# Patient Record
Sex: MALE | Race: WHITE | Employment: UNEMPLOYED | URBAN - NONMETROPOLITAN AREA
[De-identification: names, ages, dates, MRNs, and addresses within clinical notes are randomized per-mention and may not be internally consistent; named-entity substitution may affect disease eponyms.]

---

## 2024-07-18 ENCOUNTER — OFFICE VISIT (OUTPATIENT)
Dept: FAMILY MEDICINE CLINIC | Facility: CLINIC | Age: 44
End: 2024-07-18
Payer: COMMERCIAL

## 2024-07-18 VITALS
HEIGHT: 68 IN | OXYGEN SATURATION: 98 % | SYSTOLIC BLOOD PRESSURE: 122 MMHG | HEART RATE: 73 BPM | DIASTOLIC BLOOD PRESSURE: 76 MMHG | BODY MASS INDEX: 27.74 KG/M2 | TEMPERATURE: 97.3 F | WEIGHT: 183 LBS

## 2024-07-18 DIAGNOSIS — Z13.29 SCREENING FOR THYROID DISORDER: ICD-10-CM

## 2024-07-18 DIAGNOSIS — Z11.59 NEED FOR HEPATITIS C SCREENING TEST: ICD-10-CM

## 2024-07-18 DIAGNOSIS — Z11.4 SCREENING FOR HIV (HUMAN IMMUNODEFICIENCY VIRUS): ICD-10-CM

## 2024-07-18 DIAGNOSIS — Z13.220 SCREENING FOR LIPID DISORDERS: ICD-10-CM

## 2024-07-18 DIAGNOSIS — Z13.6 SCREENING FOR CARDIOVASCULAR CONDITION: ICD-10-CM

## 2024-07-18 DIAGNOSIS — Z91.89 HAS MULTIPLE SEXUAL PARTNERS: ICD-10-CM

## 2024-07-18 DIAGNOSIS — Z00.00 ANNUAL PHYSICAL EXAM: Primary | ICD-10-CM

## 2024-07-18 DIAGNOSIS — Z13.1 SCREENING FOR DIABETES MELLITUS (DM): ICD-10-CM

## 2024-07-18 PROCEDURE — 99386 PREV VISIT NEW AGE 40-64: CPT | Performed by: FAMILY MEDICINE

## 2024-07-18 NOTE — PATIENT INSTRUCTIONS
"Patient Education     Routine physical for adults   The Basics   Written by the doctors and editors at [facility]   What is a physical? -- A physical is a routine visit, or \"check-up,\" with your doctor. You might also hear it called a \"wellness visit\" or \"preventive visit.\"  During each visit, the doctor will:   Ask about your physical and mental health   Ask about your habits, behaviors, and lifestyle   Do an exam   Give you vaccines if needed   Talk to you about any medicines you take   Give advice about your health   Answer your questions  Getting regular check-ups is an important part of taking care of your health. It can help your doctor find and treat any problems you have. But it's also important for preventing health problems.  A routine physical is different from a \"sick visit.\" A sick visit is when you see a doctor because of a health concern or problem. Since physicals are scheduled ahead of time, you can think about what you want to ask the doctor.  How often should I get a physical? -- It depends on your age and health. In general, for people age 21 years and older:   If you are younger than 50 years, you might be able to get a physical every 3 years.   If you are 50 years or older, your doctor might recommend a physical every year.  If you have an ongoing health condition, like diabetes or high blood pressure, your doctor will probably want to see you more often.  What happens during a physical? -- In general, each visit will include:   Physical exam - The doctor or nurse will check your height, weight, heart rate, and blood pressure. They will also look at your eyes and ears. They will ask about how you are feeling and whether you have any symptoms that bother you.   Medicines - It's a good idea to bring a list of all the medicines you take to each doctor visit. Your doctor will talk to you about your medicines and answer any questions. Tell them if you are having any side effects that bother you. You " "should also tell them if you are having trouble paying for any of your medicines.   Habits and behaviors - This includes:   Your diet   Your exercise habits   Whether you smoke, drink alcohol, or use drugs   Whether you are sexually active   Whether you feel safe at home  Your doctor will talk to you about things you can do to improve your health and lower your risk of health problems. They will also offer help and support. For example, if you want to quit smoking, they can give you advice and might prescribe medicines. If you want to improve your diet or get more physical activity, they can help you with this, too.   Lab tests, if needed - The tests you get will depend on your age and situation. For example, your doctor might want to check your:   Cholesterol   Blood sugar   Iron level   Vaccines - The recommended vaccines will depend on your age, health, and what vaccines you already had. Vaccines are very important because they can prevent certain serious or deadly infections.   Discussion of screening - \"Screening\" means checking for diseases or other health problems before they cause symptoms. Your doctor can recommend screening based on your age, risk, and preferences. This might include tests to check for:   Cancer, such as breast, prostate, cervical, ovarian, colorectal, prostate, lung, or skin cancer   Sexually transmitted infections, such as chlamydia and gonorrhea   Mental health conditions like depression and anxiety  Your doctor will talk to you about the different types of screening tests. They can help you decide which screenings to have. They can also explain what the results might mean.   Answering questions - The physical is a good time to ask the doctor or nurse questions about your health. If needed, they can refer you to other doctors or specialists, too.  Adults older than 65 years often need other care, too. As you get older, your doctor will talk to you about:   How to prevent falling at " home   Hearing or vision tests   Memory testing   How to take your medicines safely   Making sure that you have the help and support you need at home  All topics are updated as new evidence becomes available and our peer review process is complete.  This topic retrieved from [employer] on: May 02, 2024.  Topic 498926 Version 1.0  Release: 32.4.3 - C32.122  © 2024 UpToDate, Inc. and/or its affiliates. All rights reserved.  Consumer Information Use and Disclaimer   Disclaimer: This generalized information is a limited summary of diagnosis, treatment, and/or medication information. It is not meant to be comprehensive and should be used as a tool to help the user understand and/or assess potential diagnostic and treatment options. It does NOT include all information about conditions, treatments, medications, side effects, or risks that may apply to a specific patient. It is not intended to be medical advice or a substitute for the medical advice, diagnosis, or treatment of a health care provider based on the health care provider's examination and assessment of a patient's specific and unique circumstances. Patients must speak with a health care provider for complete information about their health, medical questions, and treatment options, including any risks or benefits regarding use of medications. This information does not endorse any treatments or medications as safe, effective, or approved for treating a specific patient. UpToDate, Inc. and its affiliates disclaim any warranty or liability relating to this information or the use thereof.The use of this information is governed by the Terms of Use, available at https://www.woltersPinion.gguwer.com/en/know/clinical-effectiveness-terms. 2024© UpToDate, Inc. and its affiliates and/or licensors. All rights reserved.  Copyright   © 2024 UpToDate, Inc. and/or its affiliates. All rights reserved.

## 2024-07-18 NOTE — PROGRESS NOTES
Adult Annual Physical  Name: Jaime Carranza      : 1980      MRN: [id number]  Encounter Provider: Isaías Cosme MD  Encounter Date: 2024   Encounter department: [facility]    44-year-old patient with no past medical history presenting to the clinic to establish care.  Patient has no past family history and is not on any medication, past surgical hx of right tibia iron suresh, no tobacco or other illicit drug use, social alcohol use. 2 Female sexual partners in last 6 months, no condom usage, last checked 5 years ago but no hx of STD.  Patient will be scheduled for a 1 year annual physical unless labs indicate otherwise.  Assessment & Plan   1. Annual physical exam  2. Screening for lipid disorders  -     Lipid Panel with Direct LDL reflex; Future  3. Screening for cardiovascular condition  -     Comprehensive metabolic panel; Future  -     CBC and differential; Future  4. Screening for diabetes mellitus (DM)  -     Hemoglobin A1C; Future  5. Screening for thyroid disorder  -     T4, free; Future  -     TSH, 3rd generation; Future  6. Screening for HIV (human immunodeficiency virus)  -     HIV 1/2 AG/AB w Reflex [facility] for 2 yr old and above; Future  7. Need for hepatitis C screening test  -     Hepatitis C antibody; Future  8. Has multiple sexual partners  Comments:  Patient states that he has had 2 female partners in the last 6 months  Denies condom usage  Denies any penile discharge, pain on urination, history of STDs  Orders:  -     Chlamydia/GC amplified DNA by PCR; Future  -     RPR-Syphilis Screening (Total Syphilis IGG/IGM); Future    Immunizations and preventive care screenings were discussed with patient today. Appropriate education was printed on patient's after visit summary.    Counseling:  Alcohol/drug use: discussed moderation in alcohol intake, the recommendations for healthy alcohol use, and avoidance of illicit drug use.  Dental Health: discussed importance of  regular tooth brushing, flossing, and dental visits.  Injury prevention: discussed safety/seat belts, safety helmets, smoke detectors, carbon dioxide detectors, and smoking near bedding or upholstery.      Depression Screening and Follow-up Plan: Patient was screened for depression during today's encounter. They screened negative with a PHQ-2 score of 0.        History of Present Illness     Adult Annual Physical:  Patient presents for annual physical. Originally from  Works in construction . Lifts with fork lifts.     2 partners in last 6 months. No condom. No burning on urination, penile discharge, concerns for STD, none in past. Last checked 5 years ago.     Diet and Physical Activity:  - Diet/Nutrition: well balanced diet and consuming 3-5 servings of fruits/vegetables daily. No sodas or sugary drinks. mostly water.  - Exercise:. Weight lifting every 1-2 weeks    Depression Screening:  - PHQ-2 Score: 0    General Health:  - Sleep: 7-8 hours of sleep on average and sleeps well.  - Hearing: normal hearing bilateral ears.  - Vision: no vision problems and most recent eye exam > 1 year ago. Advised optometrist visit  - Dental: no dental visits for > 1 year and brushes teeth once daily. Last seen 2017     Health:  - History of STDs: no.   - Urinary symptoms: none.     Review of Systems   Constitutional:  Negative for chills and fever.   HENT:  Negative for ear pain and sore throat.    Eyes:  Negative for pain and visual disturbance.   Respiratory:  Negative for cough and shortness of breath.    Cardiovascular:  Negative for chest pain and palpitations.   Gastrointestinal:  Negative for abdominal pain, diarrhea, nausea and vomiting.   Genitourinary:  Negative for dysuria, hematuria, penile discharge and penile pain.   Musculoskeletal:  Negative for arthralgias and back pain.   Skin:  Negative for color change and rash.   Neurological:  Negative for seizures and syncope.         Objective     /76 (BP  "Location: Right arm)   Pulse 73   Temp (!) 97.3 °F (36.3 °C) (Tympanic)   Ht 5' 8.11\" (1.73 m)   Wt 83 kg (183 lb)   SpO2 98%   BMI 27.74 kg/m²     Physical Exam  Vitals reviewed.   Constitutional:       General: He is not in acute distress.     Appearance: He is well-developed.   HENT:      Head: Normocephalic and atraumatic.   Eyes:      Conjunctiva/sclera: Conjunctivae normal.   Cardiovascular:      Rate and Rhythm: Normal rate and regular rhythm.      Heart sounds: No murmur heard.  Pulmonary:      Effort: Pulmonary effort is normal. No respiratory distress.      Breath sounds: Normal breath sounds.   Abdominal:      Palpations: Abdomen is soft.      Tenderness: There is no abdominal tenderness.   Musculoskeletal:         General: No swelling.      Cervical back: Neck supple.   Skin:     General: Skin is warm.      Capillary Refill: Capillary refill takes less than 2 seconds.   Neurological:      Mental Status: He is alert.      Gait: Gait normal.   Psychiatric:         Mood and Affect: Mood normal.         "

## 2024-07-20 ENCOUNTER — LAB (OUTPATIENT)
Dept: LAB | Facility: CLINIC | Age: 44
End: 2024-07-20
Payer: COMMERCIAL

## 2024-07-20 DIAGNOSIS — Z11.4 SCREENING FOR HIV (HUMAN IMMUNODEFICIENCY VIRUS): ICD-10-CM

## 2024-07-20 DIAGNOSIS — Z11.59 NEED FOR HEPATITIS C SCREENING TEST: ICD-10-CM

## 2024-07-20 DIAGNOSIS — Z13.220 SCREENING FOR LIPID DISORDERS: ICD-10-CM

## 2024-07-20 DIAGNOSIS — Z91.89 HAS MULTIPLE SEXUAL PARTNERS: ICD-10-CM

## 2024-07-20 DIAGNOSIS — Z13.29 SCREENING FOR THYROID DISORDER: ICD-10-CM

## 2024-07-20 DIAGNOSIS — Z13.6 SCREENING FOR CARDIOVASCULAR CONDITION: ICD-10-CM

## 2024-07-20 DIAGNOSIS — Z13.1 SCREENING FOR DIABETES MELLITUS (DM): ICD-10-CM

## 2024-07-20 PROCEDURE — 86780 TREPONEMA PALLIDUM: CPT

## 2024-07-20 PROCEDURE — 87491 CHLMYD TRACH DNA AMP PROBE: CPT

## 2024-07-20 PROCEDURE — 80053 COMPREHEN METABOLIC PANEL: CPT

## 2024-07-20 PROCEDURE — 87591 N.GONORRHOEAE DNA AMP PROB: CPT

## 2024-07-20 PROCEDURE — 85025 COMPLETE CBC W/AUTO DIFF WBC: CPT

## 2024-07-20 PROCEDURE — 84439 ASSAY OF FREE THYROXINE: CPT

## 2024-07-20 PROCEDURE — 84443 ASSAY THYROID STIM HORMONE: CPT

## 2024-07-20 PROCEDURE — 87389 HIV-1 AG W/HIV-1&-2 AB AG IA: CPT

## 2024-07-20 PROCEDURE — 36415 COLL VENOUS BLD VENIPUNCTURE: CPT

## 2024-07-20 PROCEDURE — 80061 LIPID PANEL: CPT

## 2024-07-20 PROCEDURE — 83036 HEMOGLOBIN GLYCOSYLATED A1C: CPT

## 2024-07-20 PROCEDURE — 86803 HEPATITIS C AB TEST: CPT

## 2024-07-22 LAB
ALBUMIN SERPL BCG-MCNC: 4.6 G/DL (ref 3.5–5)
ALP SERPL-CCNC: 56 U/L (ref 34–104)
ALT SERPL W P-5'-P-CCNC: 25 U/L (ref 7–52)
ANION GAP SERPL CALCULATED.3IONS-SCNC: 9 MMOL/L (ref 4–13)
AST SERPL W P-5'-P-CCNC: 21 U/L (ref 13–39)
BASOPHILS # BLD AUTO: 0.03 THOUSANDS/ÂΜL (ref 0–0.1)
BASOPHILS NFR BLD AUTO: 1 % (ref 0–1)
BILIRUB SERPL-MCNC: 0.48 MG/DL (ref 0.2–1)
BUN SERPL-MCNC: 20 MG/DL (ref 5–25)
CALCIUM SERPL-MCNC: 10 MG/DL (ref 8.4–10.2)
CHLORIDE SERPL-SCNC: 98 MMOL/L (ref 96–108)
CHOLEST SERPL-MCNC: 246 MG/DL
CO2 SERPL-SCNC: 30 MMOL/L (ref 21–32)
CREAT SERPL-MCNC: 0.97 MG/DL (ref 0.6–1.3)
EOSINOPHIL # BLD AUTO: 0.16 THOUSAND/ÂΜL (ref 0–0.61)
EOSINOPHIL NFR BLD AUTO: 3 % (ref 0–6)
ERYTHROCYTE [DISTWIDTH] IN BLOOD BY AUTOMATED COUNT: 12.9 % (ref 11.6–15.1)
EST. AVERAGE GLUCOSE BLD GHB EST-MCNC: 123 MG/DL
GFR SERPL CREATININE-BSD FRML MDRD: 95 ML/MIN/1.73SQ M
GLUCOSE P FAST SERPL-MCNC: 81 MG/DL (ref 65–99)
HBA1C MFR BLD: 5.9 %
HCT VFR BLD AUTO: 49 % (ref 36.5–49.3)
HCV AB SER QL: NORMAL
HDLC SERPL-MCNC: 53 MG/DL
HGB BLD-MCNC: 15.8 G/DL (ref 12–17)
HIV 1+2 AB+HIV1 P24 AG SERPL QL IA: NORMAL
HIV 2 AB SERPL QL IA: NORMAL
HIV1 AB SERPL QL IA: NORMAL
HIV1 P24 AG SERPL QL IA: NORMAL
IMM GRANULOCYTES # BLD AUTO: 0.01 THOUSAND/UL (ref 0–0.2)
IMM GRANULOCYTES NFR BLD AUTO: 0 % (ref 0–2)
LDLC SERPL CALC-MCNC: 156 MG/DL (ref 0–100)
LYMPHOCYTES # BLD AUTO: 2.7 THOUSANDS/ÂΜL (ref 0.6–4.47)
LYMPHOCYTES NFR BLD AUTO: 45 % (ref 14–44)
MCH RBC QN AUTO: 29.2 PG (ref 26.8–34.3)
MCHC RBC AUTO-ENTMCNC: 32.2 G/DL (ref 31.4–37.4)
MCV RBC AUTO: 91 FL (ref 82–98)
MONOCYTES # BLD AUTO: 0.44 THOUSAND/ÂΜL (ref 0.17–1.22)
MONOCYTES NFR BLD AUTO: 8 % (ref 4–12)
NEUTROPHILS # BLD AUTO: 2.52 THOUSANDS/ÂΜL (ref 1.85–7.62)
NEUTS SEG NFR BLD AUTO: 43 % (ref 43–75)
NRBC BLD AUTO-RTO: 0 /100 WBCS
PLATELET # BLD AUTO: 228 THOUSANDS/UL (ref 149–390)
PMV BLD AUTO: 11.8 FL (ref 8.9–12.7)
POTASSIUM SERPL-SCNC: 4.7 MMOL/L (ref 3.5–5.3)
PROT SERPL-MCNC: 7.9 G/DL (ref 6.4–8.4)
RBC # BLD AUTO: 5.41 MILLION/UL (ref 3.88–5.62)
SODIUM SERPL-SCNC: 137 MMOL/L (ref 135–147)
T4 FREE SERPL-MCNC: 0.92 NG/DL (ref 0.61–1.12)
TREPONEMA PALLIDUM IGG+IGM AB [PRESENCE] IN SERUM OR PLASMA BY IMMUNOASSAY: NORMAL
TRIGL SERPL-MCNC: 187 MG/DL
TSH SERPL DL<=0.05 MIU/L-ACNC: 1.3 UIU/ML (ref 0.45–4.5)
WBC # BLD AUTO: 5.86 THOUSAND/UL (ref 4.31–10.16)

## 2024-07-23 LAB
C TRACH DNA SPEC QL NAA+PROBE: NEGATIVE
N GONORRHOEA DNA SPEC QL NAA+PROBE: NEGATIVE

## 2024-07-26 ENCOUNTER — TELEPHONE (OUTPATIENT)
Dept: FAMILY MEDICINE CLINIC | Facility: CLINIC | Age: 44
End: 2024-07-26

## 2024-07-26 NOTE — TELEPHONE ENCOUNTER
Called patient to relay test results below. Patient is aware and no questions or concerns.    Patient does not want to get [facility] notifications.

## 2024-07-26 NOTE — TELEPHONE ENCOUNTER
----- Message from Isaías Cosme MD sent at 7/25/2024  2:03 PM EDT -----  Hi ladies,    Can you please call patient and let him know that his blood work overall looks great.  His kidneys, liver, thyroid look good.  He does have prediabetes and his cholesterol levels are elevated at this time, but do not require medications yet.  I would highly recommend patient make some dietary changes and increasing exercise including trying to get 150 minutes of exercise a week.  He is negative for all of the STDs that we tested for and does not have hepatitis C or HIV. Thank you.

## 2024-08-15 ENCOUNTER — APPOINTMENT (OUTPATIENT)
Dept: RADIOLOGY | Facility: CLINIC | Age: 44
End: 2024-08-15
Payer: COMMERCIAL

## 2024-08-15 ENCOUNTER — OFFICE VISIT (OUTPATIENT)
Dept: URGENT CARE | Facility: CLINIC | Age: 44
End: 2024-08-15
Payer: COMMERCIAL

## 2024-08-15 VITALS
SYSTOLIC BLOOD PRESSURE: 138 MMHG | DIASTOLIC BLOOD PRESSURE: 87 MMHG | TEMPERATURE: 97.6 F | RESPIRATION RATE: 18 BRPM | OXYGEN SATURATION: 98 % | HEART RATE: 74 BPM

## 2024-08-15 DIAGNOSIS — M79.661 PAIN IN RIGHT LOWER LEG: Primary | ICD-10-CM

## 2024-08-15 DIAGNOSIS — S83.91XA SPRAIN OF RIGHT KNEE, UNSPECIFIED LIGAMENT, INITIAL ENCOUNTER: ICD-10-CM

## 2024-08-15 DIAGNOSIS — M79.661 PAIN IN RIGHT LOWER LEG: ICD-10-CM

## 2024-08-15 PROCEDURE — 73630 X-RAY EXAM OF FOOT: CPT

## 2024-08-15 PROCEDURE — 73590 X-RAY EXAM OF LOWER LEG: CPT

## 2024-08-15 PROCEDURE — 73562 X-RAY EXAM OF KNEE 3: CPT

## 2024-08-15 PROCEDURE — 99203 OFFICE O/P NEW LOW 30 MIN: CPT | Performed by: STUDENT IN AN ORGANIZED HEALTH CARE EDUCATION/TRAINING PROGRAM

## 2024-08-15 RX ORDER — MELOXICAM 15 MG/1
15 TABLET ORAL DAILY
Qty: 10 TABLET | Refills: 0 | Status: SHIPPED | OUTPATIENT
Start: 2024-08-15 | End: 2024-08-25
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
## 2025-01-02 ENCOUNTER — HOSPITAL ENCOUNTER (EMERGENCY)
Facility: HOSPITAL | Age: 45
Discharge: HOME | End: 2025-01-02
Attending: EMERGENCY MEDICINE
Payer: COMMERCIAL

## 2025-01-02 ENCOUNTER — OFFICE VISIT (OUTPATIENT)
Dept: URGENT CARE | Facility: CLINIC | Age: 45
End: 2025-01-02
Payer: COMMERCIAL

## 2025-01-02 ENCOUNTER — APPOINTMENT (EMERGENCY)
Dept: RADIOLOGY | Facility: HOSPITAL | Age: 45
End: 2025-01-02
Payer: COMMERCIAL

## 2025-01-02 ENCOUNTER — APPOINTMENT (EMERGENCY)
Dept: CT IMAGING | Facility: HOSPITAL | Age: 45
End: 2025-01-02
Payer: COMMERCIAL

## 2025-01-02 VITALS
OXYGEN SATURATION: 99 % | DIASTOLIC BLOOD PRESSURE: 92 MMHG | SYSTOLIC BLOOD PRESSURE: 136 MMHG | WEIGHT: 190 LBS | RESPIRATION RATE: 18 BRPM | HEIGHT: 69 IN | HEART RATE: 83 BPM | TEMPERATURE: 98.2 F | BODY MASS INDEX: 28.14 KG/M2

## 2025-01-02 DIAGNOSIS — R93.5 ABNORMAL CT OF THE ABDOMEN: ICD-10-CM

## 2025-01-02 DIAGNOSIS — R59.0 MEDIASTINAL LYMPHADENOPATHY: Primary | ICD-10-CM

## 2025-01-02 DIAGNOSIS — R91.8 PULMONARY NODULES: ICD-10-CM

## 2025-01-02 DIAGNOSIS — R07.9 CHEST PAIN, UNSPECIFIED TYPE: Primary | ICD-10-CM

## 2025-01-02 LAB
2HR DELTA HS TROPONIN: 0 NG/L
4HR DELTA HS TROPONIN: 0 NG/L
ALBUMIN SERPL BCG-MCNC: 4.5 G/DL (ref 3.5–5)
ALP SERPL-CCNC: 85 U/L (ref 34–104)
ALT SERPL W P-5'-P-CCNC: 31 U/L (ref 7–52)
ANION GAP SERPL CALCULATED.3IONS-SCNC: 6 MMOL/L (ref 4–13)
APTT PPP: 30 SECONDS (ref 23–34)
AST SERPL W P-5'-P-CCNC: 22 U/L (ref 13–39)
ATRIAL RATE: 69 BPM
B-OH-BUTYR SERPL-MCNC: <0.05 MMOL/L (ref 0.02–0.27)
BASOPHILS # BLD AUTO: 0.03 THOUSANDS/ΜL (ref 0–0.1)
BASOPHILS NFR BLD AUTO: 1 % (ref 0–1)
BILIRUB SERPL-MCNC: 0.46 MG/DL (ref 0.2–1)
BILIRUB UR QL STRIP: NEGATIVE
BUN SERPL-MCNC: 17 MG/DL (ref 5–25)
CALCIUM SERPL-MCNC: 9.8 MG/DL (ref 8.4–10.2)
CARDIAC TROPONIN I PNL SERPL HS: 3 NG/L (ref ?–50)
CHLORIDE SERPL-SCNC: 100 MMOL/L (ref 96–108)
CLARITY UR: CLEAR
CO2 SERPL-SCNC: 31 MMOL/L (ref 21–32)
COLOR UR: COLORLESS
CREAT SERPL-MCNC: 1.31 MG/DL (ref 0.6–1.3)
D DIMER PPP FEU-MCNC: 0.4 UG/ML FEU
EOSINOPHIL # BLD AUTO: 0.17 THOUSAND/ΜL (ref 0–0.61)
EOSINOPHIL NFR BLD AUTO: 3 % (ref 0–6)
ERYTHROCYTE [DISTWIDTH] IN BLOOD BY AUTOMATED COUNT: 12.9 % (ref 11.6–15.1)
GFR SERPL CREATININE-BSD FRML MDRD: 66 ML/MIN/1.73SQ M
GLUCOSE SERPL-MCNC: 90 MG/DL (ref 65–140)
GLUCOSE UR STRIP-MCNC: NEGATIVE MG/DL
HCT VFR BLD AUTO: 43 % (ref 36.5–49.3)
HGB BLD-MCNC: 14.1 G/DL (ref 12–17)
HGB UR QL STRIP.AUTO: NEGATIVE
IMM GRANULOCYTES # BLD AUTO: 0.01 THOUSAND/UL (ref 0–0.2)
IMM GRANULOCYTES NFR BLD AUTO: 0 % (ref 0–2)
INR PPP: 0.96 (ref 0.85–1.19)
KETONES UR STRIP-MCNC: NEGATIVE MG/DL
LACTATE SERPL-SCNC: 0.9 MMOL/L (ref 0.5–2)
LEUKOCYTE ESTERASE UR QL STRIP: NEGATIVE
LIPASE SERPL-CCNC: 20 U/L (ref 11–82)
LYMPHOCYTES # BLD AUTO: 1.49 THOUSANDS/ΜL (ref 0.6–4.47)
LYMPHOCYTES NFR BLD AUTO: 27 % (ref 14–44)
MAGNESIUM SERPL-MCNC: 2 MG/DL (ref 1.9–2.7)
MCH RBC QN AUTO: 27.3 PG (ref 26.8–34.3)
MCHC RBC AUTO-ENTMCNC: 32.8 G/DL (ref 31.4–37.4)
MCV RBC AUTO: 83 FL (ref 82–98)
MONOCYTES # BLD AUTO: 0.67 THOUSAND/ΜL (ref 0.17–1.22)
MONOCYTES NFR BLD AUTO: 12 % (ref 4–12)
NEUTROPHILS # BLD AUTO: 3.14 THOUSANDS/ΜL (ref 1.85–7.62)
NEUTS SEG NFR BLD AUTO: 57 % (ref 43–75)
NITRITE UR QL STRIP: NEGATIVE
NRBC BLD AUTO-RTO: 0 /100 WBCS
P AXIS: 67 DEGREES
PH UR STRIP.AUTO: 6.5 [PH]
PLATELET # BLD AUTO: 268 THOUSANDS/UL (ref 149–390)
PMV BLD AUTO: 10.9 FL (ref 8.9–12.7)
POTASSIUM SERPL-SCNC: 3.6 MMOL/L (ref 3.5–5.3)
PR INTERVAL: 148 MS
PROCALCITONIN SERPL-MCNC: 0.09 NG/ML
PROT SERPL-MCNC: 7.8 G/DL (ref 6.4–8.4)
PROT UR STRIP-MCNC: NEGATIVE MG/DL
PROTHROMBIN TIME: 13.3 SECONDS (ref 12.3–15)
QRS AXIS: 0 DEGREES
QRSD INTERVAL: 96 MS
QT INTERVAL: 414 MS
QTC INTERVAL: 443 MS
RBC # BLD AUTO: 5.16 MILLION/UL (ref 3.88–5.62)
SODIUM SERPL-SCNC: 137 MMOL/L (ref 135–147)
SP GR UR STRIP.AUTO: <1.005 (ref 1–1.03)
T WAVE AXIS: 39 DEGREES
TSH SERPL DL<=0.05 MIU/L-ACNC: 0.99 UIU/ML (ref 0.45–4.5)
UROBILINOGEN UR STRIP-ACNC: <2 MG/DL
VENTRICULAR RATE: 69 BPM
WBC # BLD AUTO: 5.51 THOUSAND/UL (ref 4.31–10.16)

## 2025-01-02 PROCEDURE — 36415 COLL VENOUS BLD VENIPUNCTURE: CPT | Performed by: EMERGENCY MEDICINE

## 2025-01-02 PROCEDURE — 71275 CT ANGIOGRAPHY CHEST: CPT

## 2025-01-02 PROCEDURE — 99285 EMERGENCY DEPT VISIT HI MDM: CPT

## 2025-01-02 PROCEDURE — 85025 COMPLETE CBC W/AUTO DIFF WBC: CPT | Performed by: EMERGENCY MEDICINE

## 2025-01-02 PROCEDURE — 99213 OFFICE O/P EST LOW 20 MIN: CPT | Performed by: PHYSICIAN ASSISTANT

## 2025-01-02 PROCEDURE — 99285 EMERGENCY DEPT VISIT HI MDM: CPT | Performed by: EMERGENCY MEDICINE

## 2025-01-02 PROCEDURE — 85379 FIBRIN DEGRADATION QUANT: CPT | Performed by: EMERGENCY MEDICINE

## 2025-01-02 PROCEDURE — 81003 URINALYSIS AUTO W/O SCOPE: CPT | Performed by: EMERGENCY MEDICINE

## 2025-01-02 PROCEDURE — 84484 ASSAY OF TROPONIN QUANT: CPT | Performed by: EMERGENCY MEDICINE

## 2025-01-02 PROCEDURE — 84443 ASSAY THYROID STIM HORMONE: CPT | Performed by: EMERGENCY MEDICINE

## 2025-01-02 PROCEDURE — 83690 ASSAY OF LIPASE: CPT | Performed by: EMERGENCY MEDICINE

## 2025-01-02 PROCEDURE — 84145 PROCALCITONIN (PCT): CPT | Performed by: EMERGENCY MEDICINE

## 2025-01-02 PROCEDURE — 82010 KETONE BODYS QUAN: CPT | Performed by: EMERGENCY MEDICINE

## 2025-01-02 PROCEDURE — 85730 THROMBOPLASTIN TIME PARTIAL: CPT | Performed by: EMERGENCY MEDICINE

## 2025-01-02 PROCEDURE — 83605 ASSAY OF LACTIC ACID: CPT | Performed by: EMERGENCY MEDICINE

## 2025-01-02 PROCEDURE — 96365 THER/PROPH/DIAG IV INF INIT: CPT

## 2025-01-02 PROCEDURE — 93005 ELECTROCARDIOGRAM TRACING: CPT

## 2025-01-02 PROCEDURE — 71045 X-RAY EXAM CHEST 1 VIEW: CPT

## 2025-01-02 PROCEDURE — 93005 ELECTROCARDIOGRAM TRACING: CPT | Performed by: PHYSICIAN ASSISTANT

## 2025-01-02 PROCEDURE — 74174 CTA ABD&PLVS W/CONTRAST: CPT

## 2025-01-02 PROCEDURE — 83735 ASSAY OF MAGNESIUM: CPT | Performed by: EMERGENCY MEDICINE

## 2025-01-02 PROCEDURE — 80053 COMPREHEN METABOLIC PANEL: CPT | Performed by: EMERGENCY MEDICINE

## 2025-01-02 PROCEDURE — 85610 PROTHROMBIN TIME: CPT | Performed by: EMERGENCY MEDICINE

## 2025-01-02 PROCEDURE — 96366 THER/PROPH/DIAG IV INF ADDON: CPT

## 2025-01-02 PROCEDURE — 96375 TX/PRO/DX INJ NEW DRUG ADDON: CPT

## 2025-01-02 RX ORDER — ONDANSETRON 2 MG/ML
4 INJECTION INTRAMUSCULAR; INTRAVENOUS ONCE
Status: COMPLETED | OUTPATIENT
Start: 2025-01-02 | End: 2025-01-02

## 2025-01-02 RX ORDER — PANTOPRAZOLE SODIUM 40 MG/10ML
40 INJECTION, POWDER, LYOPHILIZED, FOR SOLUTION INTRAVENOUS ONCE
Status: COMPLETED | OUTPATIENT
Start: 2025-01-02 | End: 2025-01-02

## 2025-01-02 RX ORDER — SODIUM CHLORIDE, SODIUM GLUCONATE, SODIUM ACETATE, POTASSIUM CHLORIDE, MAGNESIUM CHLORIDE, SODIUM PHOSPHATE, DIBASIC, AND POTASSIUM PHOSPHATE .53; .5; .37; .037; .03; .012; .00082 G/100ML; G/100ML; G/100ML; G/100ML; G/100ML; G/100ML; G/100ML
1000 INJECTION, SOLUTION INTRAVENOUS ONCE
Status: COMPLETED | OUTPATIENT
Start: 2025-01-02 | End: 2025-01-02

## 2025-01-02 RX ADMIN — ONDANSETRON 4 MG: 2 INJECTION INTRAMUSCULAR; INTRAVENOUS at 19:45

## 2025-01-02 RX ADMIN — SODIUM CHLORIDE, SODIUM GLUCONATE, SODIUM ACETATE, POTASSIUM CHLORIDE, MAGNESIUM CHLORIDE, SODIUM PHOSPHATE, DIBASIC, AND POTASSIUM PHOSPHATE 1000 ML: .53; .5; .37; .037; .03; .012; .00082 INJECTION, SOLUTION INTRAVENOUS at 19:44

## 2025-01-02 RX ADMIN — MORPHINE SULFATE 2 MG: 2 INJECTION, SOLUTION INTRAMUSCULAR; INTRAVENOUS at 19:49

## 2025-01-02 RX ADMIN — IOHEXOL 100 ML: 350 INJECTION, SOLUTION INTRAVENOUS at 19:14

## 2025-01-02 RX ADMIN — PANTOPRAZOLE SODIUM 40 MG: 40 INJECTION, POWDER, FOR SOLUTION INTRAVENOUS at 19:45

## 2025-01-02 NOTE — ED PROVIDER NOTES
Time reflects when diagnosis was documented in both MDM as applicable and the Disposition within this note       Time User Action Codes Description Comment    1/2/2025  9:52 PM Pedritodonato Lulu FAISAL Add [R59.0] Mediastinal lymphadenopathy     1/2/2025  9:52 PM Shani Lulu FAISAL Add [R91.8] Pulmonary nodules     1/2/2025  9:53 PM Shani Lulu FAISAL Modify [R91.8] Pulmonary nodules MILDRED 4mm LLL 6mm require CT chest f/u in 3 months    1/2/2025  9:54 PM Pedritoricardoluis antonio Lulu FAISAL Add [R93.5] Abnormal CT of the abdomen     1/2/2025  9:54 PM Shani Lulu FAISAL Modify [R93.5] Abnormal CT of the abdomen hyperdense foci of Left hepatic lobe rmm recommend MRI w/wo furher fu    1/2/2025 10:00 PM Shani Lulu FAISAL Modify [R93.5] Abnormal CT of the abdomen hyperdense foci of Left hepatic lobe 4 rmm recommend MRI w/wo furher fu    1/2/2025 10:35 PM Shani Lulu FAISAL Modify [R59.0] Mediastinal lymphadenopathy lymphoma vs other          ED Disposition       ED Disposition   Discharge    Condition   Stable    Date/Time   Thu Jan 2, 2025 10:50 PM    Comment   Jaime Carranza discharge to home/self care.                   Assessment & Plan       Medical Decision Making  44-year-old otherwise healthy male except for kidney stones presents with 3-week history of lower chest pain and epigastric pain which radiates to his shoulders and back.  The only it does wake him up it seems to be worse with laying down.  There does not appear to be an infectious etiology by history.  Initial blood pressure was elevated on recheck it is improved differential diagnosis includes acute coronary syndrome pulmonary embolism cholelithiasis gastritis pancreatitis.  After reevaluation patientpatient's D-dimer is negative his chest x-ray is abnormal with increased markings to the right middle lobe recommend patient undergo CTA of the chest abdomen pelvis to evaluate for possibility of dissection there is no clinical evidence of pancreatitis or transaminitis he has no  reproducible abdominal tenderness no evidence of sepsis.  Will initiate symptomatic management with antiemetics low-dose morphine Protonix and reevaluate    UC note from gabi today reviewed as wellas primary care visit on 7/18/2024 and ED careeveryhwere recoreds    Amount and/or Complexity of Data Reviewed  Labs: ordered.  Radiology: ordered and independent interpretation performed.    Risk  Prescription drug management.        ED Course as of 01/02/25 2321   Thu Jan 02, 2025   1846 Contacted [facility] reading room for wet read of CXR   1937 Recheck /103   2149 Secure chat with radiology reguarding mediastinual LAD reviewed report will secure chat with heme/onc to determine next steps in workup   2150 Secure chat with Dr. Jackelin Isbell heme/onc   2247 Dr. Isbell will contact the patient tomorrow and inform patient the best way to obtain a biopsy to confirm diagnosis (Dr. Isbell needs to confer with thoracic surgery) patient and significant other provided with copy of CT report discussed other incidental findings of pulmonary nodules and liver densities   2318 Extensive discussion with patient and significant other regarding possibility of lymphoma which is a form of cancer and that Dr. Isbell will be contacting patient with Bangl[place] interpretor to determine best way to obtain biopsy. also reviewed incidental findings of pulmonay nodules and liver abnormalities. Utilized  [id number] Alma to reivewed plan and finding   2321 Left AC PIV removed by me with catheter intact       Medications   ondansetron (ZOFRAN) injection 4 mg (4 mg Intravenous Given 1/2/25 1945)   pantoprazole (PROTONIX) injection 40 mg (40 mg Intravenous Given 1/2/25 1945)   morphine injection 2 mg (2 mg Intravenous Given 1/2/25 1949)   multi-electrolyte (ISOLYTE-S PH 7.4) bolus 1,000 mL (0 mL Intravenous Stopped 1/2/25 2202)   iohexol (OMNIPAQUE) 350 MG/ML injection (MULTI-DOSE) 100 mL (100 mL Intravenous Given 1/2/25 1914)       ED Risk  Strat Scores                          SBIRT 20yo+      Flowsheet Row Most Recent Value   Initial Alcohol Screen: US AUDIT-C     1. How often do you have a drink containing alcohol? 0 Filed at: 01/02/2025 2226   2. How many drinks containing alcohol do you have on a typical day you are drinking?  0 Filed at: 01/02/2025 2226   3a. Male UNDER 65: How often do you have five or more drinks on one occasion? 0 Filed at: 01/02/2025 2226   Audit-C Score 0 Filed at: 01/02/2025 2226   SENDY: How many times in the past year have you...    Used an illegal drug or used a prescription medication for non-medical reasons? Never Filed at: 01/02/2025 2226                            History of Present Illness       Chief Complaint   Patient presents with    Chest Pain     Pt states that chest pain started about 3 weeks ago - came in to be evaluated due to increased in pain. Complains of nausea        History reviewed. No pertinent past medical history.   Past Surgical History:   Procedure Laterality Date    LEG SURGERY Right 2014    Iron suresh in Right tibia      History reviewed. No pertinent family history.   Social History     Tobacco Use    Smoking status: Never     Passive exposure: Never    Smokeless tobacco: Never   Vaping Use    Vaping status: Never Used   Substance Use Topics    Alcohol use: Never    Drug use: Never      E-Cigarette/Vaping    E-Cigarette Use Never User       E-Cigarette/Vaping Substances    Nicotine No     THC No     CBD No     Flavoring No     Other No     Unknown No       I have reviewed and agree with the history as documented.     44-year-old male presents from urgent care for further evaluation of chest pain which is at the lower border of his ribs and in the epigastric region which has been present for 3 weeks patient states that it is intermittent initially said nothing seems to make it better or worse but lying then he said that lying down will make it worse it is sharp in character it does radiate to his  upper back and shoulders he denies any low back pain there is no history of trauma or falls no fever chills cough or upper respiratory complaints no shortness of breath no increasing dyspnea on exertion no pleuritic pain no chest pain with exertion he denies prior history of this pain upon clarification patient has taken Mobic in the past he is currently on no medications and has not taken any medications to help with this he is denying any heartburn he states his stools are slightly darker than usual there is no history of lightheadedness dizziness or diaphoresis he states that 3 weeks ago he did have some urinary urgency and decreased urine output but no gross hematuria no prior history no lower extremity edema no prior history of DVT or PE  Past medical history kidney stones in 2021 past surgical history is leg surgery medications none   [id number] was utilized        Review of Systems   Constitutional:  Positive for activity change. Negative for appetite change, chills, diaphoresis and fever.   HENT:  Negative for congestion, ear pain, rhinorrhea, sneezing and sore throat.    Eyes:  Negative for discharge.   Respiratory:  Negative for cough and shortness of breath.    Cardiovascular:  Positive for chest pain. Negative for leg swelling.   Gastrointestinal:  Positive for abdominal pain and nausea. Negative for blood in stool, diarrhea and vomiting.   Endocrine: Negative for polyuria.   Genitourinary:  Positive for difficulty urinating. Negative for dysuria, flank pain, frequency and urgency.   Musculoskeletal:  Positive for back pain (upper back pain). Negative for myalgias, neck pain and neck stiffness.   Skin:  Negative for rash.   Neurological:  Negative for dizziness, weakness, light-headedness, numbness and headaches.   Hematological:  Negative for adenopathy.   Psychiatric/Behavioral:  Negative for confusion.    All other systems reviewed and are negative.          Objective       ED Triage Vitals  [01/02/25 1852]   Temperature Pulse Blood Pressure Respirations SpO2 Patient Position - Orthostatic VS   98.9 °F (37.2 °C) 69 (!) 179/132 22 98 % Sitting      Temp Source Heart Rate Source BP Location FiO2 (%) Pain Score    Temporal Monitor Right arm -- 5      Vitals      Date and Time Temp Pulse SpO2 Resp BP Pain Score FACES Pain Rating User   01/02/25 2300 -- 79 98 % 18 160/96 -- --    01/02/25 2230 -- 71 98 % 18 143/88 -- --    01/02/25 2200 -- 82 97 % 18 148/85 -- --    01/02/25 2100 -- 72 97 % 16 153/89 -- --    01/02/25 2030 -- 64 98 % 16 156/94 -- --    01/02/25 2025 -- -- -- -- -- 5 --    01/02/25 1949 -- -- -- -- -- 8 --    01/02/25 1944 -- 71 98 % 16 168/96 -- --    01/02/25 1852 98.9 °F (37.2 °C) 69 98 % 22 179/132 5 -- AB            Physical Exam  Vitals and nursing note reviewed.   Constitutional:       General: He is not in acute distress.     Appearance: He is not ill-appearing, toxic-appearing or diaphoretic.   HENT:      Right Ear: Tympanic membrane and external ear normal.      Left Ear: Tympanic membrane and external ear normal.      Nose: Nose normal. No congestion or rhinorrhea.      Mouth/Throat:      Mouth: Mucous membranes are moist.      Pharynx: No oropharyngeal exudate or posterior oropharyngeal erythema.   Eyes:      General:         Right eye: No discharge.         Left eye: No discharge.      Extraocular Movements: Extraocular movements intact.      Conjunctiva/sclera: Conjunctivae normal.      Pupils: Pupils are equal, round, and reactive to light.   Cardiovascular:      Rate and Rhythm: Normal rate and regular rhythm.      Pulses: Normal pulses.      Heart sounds: No murmur heard.  Pulmonary:      Effort: Pulmonary effort is normal. No respiratory distress.      Breath sounds: Normal breath sounds. No wheezing or rales.      Comments: No reporducible chest wall tenderness  Chest:      Chest wall: No tenderness.   Abdominal:      General: Bowel sounds are normal. There  is no distension.      Palpations: Abdomen is soft.      Tenderness: There is no abdominal tenderness. There is no right CVA tenderness, left CVA tenderness, guarding or rebound.      Comments: No reproducible abdom tendnerenss. No midline T or L spine tenderness   Musculoskeletal:      Cervical back: Normal range of motion. No rigidity or tenderness.      Right lower leg: No edema.      Left lower leg: No edema.      Comments: 2+ AT pulses   Lymphadenopathy:      Cervical: No cervical adenopathy.   Skin:     General: Skin is warm and dry.      Capillary Refill: Capillary refill takes less than 2 seconds.      Findings: No rash.      Comments: No rash to chest wall   Neurological:      General: No focal deficit present.      Mental Status: He is alert and oriented to person, place, and time.      Cranial Nerves: No cranial nerve deficit.      Sensory: No sensory deficit.      Motor: No weakness.      Coordination: Coordination normal.   Psychiatric:         Mood and Affect: Mood normal.         Results Reviewed       Procedure Component Value Units Date/Time    HS Troponin I 4hr [[id number]]  (Normal) Collected: 01/02/25 2203    Lab Status: Final result Specimen: Blood from Arm, Left Updated: 01/02/25 2239     hs TnI 4hr 3 ng/L      Delta 4hr hsTnI 0 ng/L     UA w Reflex to Microscopic w Reflex to Culture [[id number]]  (Abnormal) Collected: 01/02/25 2203    Lab Status: Final result Specimen: Urine, Clean Catch Updated: 01/02/25 2215     Color, UA Colorless     Clarity, UA Clear     Specific Gravity, UA <1.005     pH, UA 6.5     Leukocytes, UA Negative     Nitrite, UA Negative     Protein, UA Negative mg/dl      Glucose, UA Negative mg/dl      Ketones, UA Negative mg/dl      Urobilinogen, UA <2.0 mg/dl      Bilirubin, UA Negative     Occult Blood, UA Negative    HS Troponin I 2hr [[id number]]  (Normal) Collected: 01/02/25 1955    Lab Status: Final result Specimen: Blood from Arm, Left Updated: 01/02/25 2118     hs TnI  2hr 3 ng/L      Delta 2hr hsTnI 0 ng/L     Procalcitonin [[id number]]  (Normal) Collected: 01/02/25 1806    Lab Status: Final result Specimen: Blood from Arm, Left Updated: 01/02/25 2105     Procalcitonin 0.09 ng/ml     TSH, 3rd generation with Free T4 reflex [[id number]]  (Normal) Collected: 01/02/25 1806    Lab Status: Final result Specimen: Blood from Arm, Left Updated: 01/02/25 1945     TSH 3RD GENERATON 0.993 uIU/mL     HS Troponin 0hr (reflex protocol) [[id number]]  (Normal) Collected: 01/02/25 1806    Lab Status: Final result Specimen: Blood from Arm, Left Updated: 01/02/25 1844     hs TnI 0hr 3 ng/L     D-Dimer [[id number]]  (Normal) Collected: 01/02/25 1806    Lab Status: Final result Specimen: Blood from Arm, Left Updated: 01/02/25 1840     D-Dimer, Quant 0.40 ug/ml FEU     Protime-INR [[id number]]  (Normal) Collected: 01/02/25 1806    Lab Status: Final result Specimen: Blood from Arm, Left Updated: 01/02/25 1837     Protime 13.3 seconds      INR 0.96    Narrative:      INR Therapeutic Range    Indication                                             INR Range      Atrial Fibrillation                                               2.0-3.0  Hypercoagulable State                                    2.0.2.3  Left Ventricular Asist Device                            2.0-3.0  Mechanical Heart Valve                                  -    Aortic(with afib, MI, embolism, HF, LA enlargement,    and/or coagulopathy)                                     2.0-3.0 (2.5-3.5)     Mitral                                                             2.5-3.5  Prosthetic/Bioprosthetic Heart Valve               2.0-3.0  Venous thromboembolism (VTE: VT, PE        2.0-3.0    APTT [[id number]]  (Normal) Collected: 01/02/25 1806    Lab Status: Final result Specimen: Blood from Arm, Left Updated: 01/02/25 1837     PTT 30 seconds     Lactic acid, plasma (w/reflex if result > 2.0) [[id number]]  (Normal) Collected: 01/02/25 1806    Lab Status: Final  result Specimen: Blood from Arm, Left Updated: 01/02/25 1837     LACTIC ACID 0.9 mmol/L     Narrative:      Result may be elevated if tourniquet was used during collection.    Beta Hydroxybutyrate [[id number]]  (Normal) Collected: 01/02/25 1806    Lab Status: Final result Specimen: Blood from Arm, Left Updated: 01/02/25 1837     Beta- Hydroxybutyrate <0.05 mmol/L     Comprehensive metabolic panel [[id number]]  (Abnormal) Collected: 01/02/25 1806    Lab Status: Final result Specimen: Blood from Arm, Left Updated: 01/02/25 1835     Sodium 137 mmol/L      Potassium 3.6 mmol/L      Chloride 100 mmol/L      CO2 31 mmol/L      ANION GAP 6 mmol/L      BUN 17 mg/dL      Creatinine 1.31 mg/dL      Glucose 90 mg/dL      Calcium 9.8 mg/dL      AST 22 U/L      ALT 31 U/L      Alkaline Phosphatase 85 U/L      Total Protein 7.8 g/dL      Albumin 4.5 g/dL      Total Bilirubin 0.46 mg/dL      eGFR 66 ml/min/1.73sq m     Narrative:      National Kid[employer] Disease Foundation guidelines for Chronic Kidney Disease (CKD):     Stage 1 with normal or high GFR (GFR > 90 mL/min/1.73 square meters)    Stage 2 Mild CKD (GFR = 60-89 mL/min/1.73 square meters)    Stage 3A Moderate CKD (GFR = 45-59 mL/min/1.73 square meters)    Stage 3B Moderate CKD (GFR = 30-44 mL/min/1.73 square meters)    Stage 4 Severe CKD (GFR = 15-29 mL/min/1.73 square meters)    Stage 5 End Stage CKD (GFR <15 mL/min/1.73 square meters)  Note: GFR calculation is accurate only with a steady state creatinine    Magnesium [[id number]]  (Normal) Collected: 01/02/25 1806    Lab Status: Final result Specimen: Blood from Arm, Left Updated: 01/02/25 1835     Magnesium 2.0 mg/dL     Lipase [[id number]]  (Normal) Collected: 01/02/25 1806    Lab Status: Final result Specimen: Blood from Arm, Left Updated: 01/02/25 1835     Lipase 20 u/L     CBC and differential [[id number]] Collected: 01/02/25 1806    Lab Status: Final result Specimen: Blood from Arm, Left Updated: 01/02/25 4015     WBC 5.51  Thousand/uL      RBC 5.16 Million/uL      Hemoglobin 14.1 g/dL      Hematocrit 43.0 %      MCV 83 fL      MCH 27.3 pg      MCHC 32.8 g/dL      RDW 12.9 %      MPV 10.9 fL      Platelets 268 Thousands/uL      nRBC 0 /100 WBCs      Segmented % 57 %      Immature Grans % 0 %      Lymphocytes % 27 %      Monocytes % 12 %      Eosinophils Relative 3 %      Basophils Relative 1 %      Absolute Neutrophils 3.14 Thousands/µL      Absolute Immature Grans 0.01 Thousand/uL      Absolute Lymphocytes 1.49 Thousands/µL      Absolute Monocytes 0.67 Thousand/µL      Eosinophils Absolute 0.17 Thousand/µL      Basophils Absolute 0.03 Thousands/µL             CTA dissection protocol chest abdomen pelvis w wo contrast   Final Interpretation by Lluvia Sanchez MD (01/02 2129)      1) No thoracic or abdominal aortic aneurysm, intramural hematoma or dissection.      2) Lymphadenopathy in the mediastinal, bilateral hilar, lower paraesophageal and gastrohepatic ligament chains and borderline enlarged left supraclavicular lymph node, as detailed above. This is concerning for lymphoma, or less likely metastatic disease.    No definite primary malignancy identified within limitation of CT technique.      3) Two nonspecific pulmonary nodules, the larger measuring 4 mm. Recommend reevaluation with chest CT in 3 months as metastatic disease cannot be entirely excluded.      4) Two hyperdense peripheral foci in the left hepatic lobe at the dome, measuring up to 4 mm, statistically likely flash filling hemangiomas or arterioportal shunts. Recommend attention on follow-up for confirmation with abdominal MRI (without and with    IV contrast).      5) Additional findings as above.      I personally discussed these findings with NANCY PINO via HIPAA compliant secure electronic messaging on 1/2/2025 at 9:27 PM with confirmation of receipt.               Workstation performed: [id number]         XR chest 1 view portable   ED Interpretation by  Lulu Mcleod MD (01/02 1845)   Per my independent interpretation. Radiologist to provide formal read. Increased marking RML prior CT a/p from 2021 (indicated rml ateckectasis)      Final Interpretation by Lluvia Sanchez MD (01/02 1936)      Prominent appearance of the bilateral hilar regions and thickening of the paratracheal stripe bilaterally, corresponding to lymphadenopathy on the subsequent CT of the chest, abdomen and pelvis.               Workstation performed: [id number]             ECG 12 Lead Documentation Only    Date/Time: 1/2/2025 5:35 PM    Performed by: Lulu Mcleod MD  Authorized by: Lulu Mcleod MD    Indications / Diagnosis:  Cp  ECG reviewed by me, the ED Provider: yes    Patient location:  ED  Previous ECG:     Previous ECG:  Unavailable  Rate:     ECG rate:  69    ECG rate assessment: normal    Rhythm:     Rhythm: sinus rhythm    QRS:     QRS axis:  Normal  Comments:       no acute ischemic changes      ED Medication and Procedure Management   Prior to Admission Medications   Prescriptions Last Dose Informant Patient Reported? Taking?   meloxicam (Mobic) 15 mg tablet   No No   Sig: Take 1 tablet (15 mg total) by mouth daily for 10 days      Facility-Administered Medications: None     Patient's Medications   Discharge Prescriptions    No medications on file       ED SEPSIS DOCUMENTATION   Time reflects when diagnosis was documented in both MDM as applicable and the Disposition within this note       Time User Action Codes Description Comment    1/2/2025  9:52 PM Lulu Mcleod Add [R59.0] Mediastinal lymphadenopathy     1/2/2025  9:52 PM Lulu Mcleod Add [R91.8] Pulmonary nodules     1/2/2025  9:53 PM Lulu Mcleod Modify [R91.8] Pulmonary nodules MILDRED 4mm LLL 6mm require CT chest f/u in 3 months    1/2/2025  9:54 PM Lulu Mcleod Add [R93.5] Abnormal CT of the abdomen     1/2/2025  9:54 PM Lulu Mcleod Modify [R93.5] Abnormal CT  of the abdomen hyperdense foci of Left hepatic lobe rmm recommend MRI w/wo furher fu    1/2/2025 10:00 PM Lulu Mcleod Modify [R93.5] Abnormal CT of the abdomen hyperdense foci of Left hepatic lobe 4 rmm recommend MRI w/wo furher fu    1/2/2025 10:35 PM Lulu Mcleod Modify [R59.0] Mediastinal lymphadenopathy lymphoma vs other                 Lulu Mcleod MD  01/02/25 8538

## 2025-01-02 NOTE — Clinical Note
Jaime Carranza was seen and treated in our emergency department on 1/2/2025.        No work until cleared by Family Doctor/Orthopedics        Diagnosis:     Jaime  .    He may return on this date:          If you have any questions or concerns, please don't hesitate to call.      Lulu Mcleod MD    ______________________________           _______________          _______________  Hospital Representative                              Date                                Time

## 2025-01-02 NOTE — PROGRESS NOTES
[facility]        NAME: Jaime Carranza is a 44 y.o. male  : 1980    MRN: [id number]  DATE: 2025  TIME: 4:44 PM    Assessment and Plan   Chest pain, unspecified type [R07.9]  1. Chest pain, unspecified type  Transfer to other facility          Results    EKG: normal sinus without ST changes.     Patient Instructions     Assessment & Plan      Follow up with PCP in 3-5 days.  Proceed to  ER  Chief Complaint     Chief Complaint   Patient presents with    Chest Pain     Has had for several weeks  Got worse today and that's why he came in  States it is mid epigastric area and radiates to side and to back  States that it is worse when laying flat  Rates as an 8 on a 1-10 scale         History of Present Illness     History of Present Illness      Patient has been taking Mobic x 1 week. Notes melena this week.     Chest Pain   This is a new problem. The problem occurs intermittently. The problem has been rapidly worsening (today). Pain location: epigastric. Quality: pressure. Radiates to: side and back. Pertinent negatives include no abdominal pain, cough, fever, nausea, palpitations, shortness of breath or vomiting. Associated with: laying down. He has tried nothing for the symptoms.       Review of Systems   Review of Systems   Constitutional:  Negative for chills and fever.   HENT: Negative.     Respiratory:  Negative for cough and shortness of breath.    Cardiovascular:  Positive for chest pain. Negative for palpitations.   Gastrointestinal:  Positive for blood in stool (melena). Negative for abdominal pain, nausea and vomiting.         Current Medications       Current Outpatient Medications:     meloxicam (Mobic) 15 mg tablet, Take 1 tablet (15 mg total) by mouth daily for 10 days, Disp: 10 tablet, Rfl: 0    Current Allergies     Allergies as of 2025    (No Known Allergies)            The following portions of the patient's history were reviewed and updated as appropriate:  "allergies, current medications, past family history, past medical history, past social history, past surgical history and problem list.     History reviewed. No pertinent past medical history.    Past Surgical History:   Procedure Laterality Date    LEG SURGERY Right 2014    Iron suresh in Right tibia       No family history on file.      Medications have been verified.        Objective   /92   Pulse 83   Temp 98.2 °F (36.8 °C)   Resp 18   Ht 5' 9\" (1.753 m)   Wt 86.2 kg (190 lb)   SpO2 99%   BMI 28.06 kg/m²   No LMP for male patient.       Physical Exam     Physical Exam  Vitals reviewed.   Constitutional:       General: He is not in acute distress.     Appearance: He is well-developed. He is not diaphoretic.   HENT:      Head: Normocephalic.      Nose: Nose normal.   Cardiovascular:      Rate and Rhythm: Normal rate and regular rhythm.      Heart sounds: Normal heart sounds. No murmur heard.     No friction rub. No gallop.   Pulmonary:      Effort: Pulmonary effort is normal. No respiratory distress.      Breath sounds: Normal breath sounds. No wheezing, rhonchi or rales.   Chest:      Chest wall: Tenderness present.       Abdominal:      Palpations: Abdomen is soft.      Tenderness: There is no abdominal tenderness.   Skin:     General: Skin is warm.   Neurological:      Mental Status: He is alert and oriented to person, place, and time.   Psychiatric:         Behavior: Behavior normal.         Thought Content: Thought content normal.         Judgment: Judgment normal.                     "

## 2025-01-03 ENCOUNTER — PREP FOR PROCEDURE (OUTPATIENT)
Dept: PULMONOLOGY | Facility: CLINIC | Age: 45
End: 2025-01-03

## 2025-01-03 ENCOUNTER — DOCUMENTATION (OUTPATIENT)
Dept: HEMATOLOGY ONCOLOGY | Facility: CLINIC | Age: 45
End: 2025-01-03

## 2025-01-03 ENCOUNTER — TELEPHONE (OUTPATIENT)
Age: 45
End: 2025-01-03

## 2025-01-03 ENCOUNTER — TELEPHONE (OUTPATIENT)
Dept: PULMONOLOGY | Facility: CLINIC | Age: 45
End: 2025-01-03

## 2025-01-03 VITALS
HEART RATE: 79 BPM | TEMPERATURE: 97.9 F | DIASTOLIC BLOOD PRESSURE: 96 MMHG | SYSTOLIC BLOOD PRESSURE: 160 MMHG | OXYGEN SATURATION: 98 % | RESPIRATION RATE: 18 BRPM

## 2025-01-03 DIAGNOSIS — R59.0 MEDIASTINAL LYMPHADENOPATHY: Primary | ICD-10-CM

## 2025-01-03 LAB
ATRIAL RATE: 81 BPM
P AXIS: 55 DEGREES
PR INTERVAL: 146 MS
QRS AXIS: -4 DEGREES
QRSD INTERVAL: 98 MS
QT INTERVAL: 398 MS
QTC INTERVAL: 462 MS
T WAVE AXIS: 32 DEGREES
VENTRICULAR RATE: 81 BPM

## 2025-01-03 PROCEDURE — 93010 ELECTROCARDIOGRAM REPORT: CPT | Performed by: INTERNAL MEDICINE

## 2025-01-03 NOTE — TELEPHONE ENCOUNTER
Called and spoke with patient and family member. He is aware he is scheduled for consult 01/22. We have a tent dos of 01/24 for EBUS as requested by . I will contact patient again after his consultation to confirm EBUS    All procedure instructions have been discussed\      Thank You

## 2025-01-03 NOTE — INCIDENTAL FINDINGS
The following findings require follow up:  Radiographic finding   Finding: pulmonary nodules MILDRED 4mm; LLL 6mm   Follow up required: CT chest    Follow up should be done within 3 months    Please notify the following clinician to assist with the follow up:   Dr. Bowles    Incidental finding results were discussed with the Patient by Lulu Mcleod MD on 01/02/25.   They expressed understanding and all questions answered.

## 2025-01-03 NOTE — TELEPHONE ENCOUNTER
Please assist with a sooner appointment for the patient , as per the clinical review the patient should be seen by a BUNNY with in 7/10 days. Patient okay with being seen at any location .    Patient best contact number [phone]

## 2025-01-03 NOTE — INCIDENTAL FINDINGS
The following findings require follow up:  Radiographic finding   Finding: Hyperdense foci Left hepatic lobe upto 4mm   Follow up required: abdominal MRI with and without contrast   Follow up should be done within 1 month(s)    Please notify the following clinician to assist with the follow up:   Dr. Beavers    Incidental finding results were discussed with the Patient by Lulu Mcleod MD on 01/02/25.   They expressed understanding and all questions answered.

## 2025-01-03 NOTE — DISCHARGE INSTRUCTIONS
You have symptoms concerning for lymphoma based on your chest scan and CXR  Dr. Isbell will be contacting you tomorrow you will require followup to get a sample of your lymph nodes (biopsy) she is working on those details  Tylenol 650mg every 6 hours as needed for pain, fever (max 3000mg in 24 hours) avioid mobic or ibuprofen  Return worsening or change in chest pain trouble breathing cough or any concerns  You also have nodule in your lung and your densities has blushed area that may require additional testing per Dr. Isbell or Dr. Gaona

## 2025-01-09 ENCOUNTER — CONSULT (OUTPATIENT)
Dept: HEMATOLOGY ONCOLOGY | Facility: CLINIC | Age: 45
End: 2025-01-09
Payer: COMMERCIAL

## 2025-01-09 VITALS
WEIGHT: 191 LBS | SYSTOLIC BLOOD PRESSURE: 138 MMHG | BODY MASS INDEX: 28.29 KG/M2 | OXYGEN SATURATION: 95 % | TEMPERATURE: 97.9 F | HEART RATE: 69 BPM | DIASTOLIC BLOOD PRESSURE: 80 MMHG | RESPIRATION RATE: 16 BRPM | HEIGHT: 69 IN

## 2025-01-09 VITALS
HEART RATE: 79 BPM | OXYGEN SATURATION: 98 % | RESPIRATION RATE: 18 BRPM | TEMPERATURE: 97.9 F | DIASTOLIC BLOOD PRESSURE: 96 MMHG | SYSTOLIC BLOOD PRESSURE: 160 MMHG

## 2025-01-09 DIAGNOSIS — Z83.2 FAMILY HISTORY OF GLUCOSE-6-PHOSPHATE DEHYDROGENASE (G6PD) DEFICIENCY: ICD-10-CM

## 2025-01-09 DIAGNOSIS — R59.0 MEDIASTINAL LYMPHADENOPATHY: Primary | ICD-10-CM

## 2025-01-09 PROCEDURE — 99244 OFF/OP CNSLTJ NEW/EST MOD 40: CPT

## 2025-01-09 NOTE — ASSESSMENT & PLAN NOTE
Orders:    Glucose 6-Phosphate Dehydrogenase (G6PD), Quantitative, Blood and Hemoglobin; Future    44-year-old male with mediastinal lymphadenopathy.  Informed him that we will await biopsy results, at that time he will meet with one of her oncologist to review the results and offer treatment if appropriate.    In the meantime, we will obtain a PET/CT with mediastinal, right supraclavicular, periesophageal lymphadenopathy.  In addition, we will repeat CBCD and obtain LDH, flow cytometry prior to his office visit with Dr. Tomlinson on 2/11/2024.    Hereports this was found to have a G6PD deficiency and he would like to be tested for it.  Order placed.    Informed patient we will fill out the paperwork for his child support for court appearance within 5 days.  It will be mailed to him and also uploaded to his [employer].    Will see patient back in the office on 2/11/2024 with Dr. Tomlinson at the [facility].  Patient has no additional questions at this time.  He is aware to contact us for any additional questions/concerns.

## 2025-01-09 NOTE — ASSESSMENT & PLAN NOTE
Orders:    Ambulatory Referral to Hematology / Oncology    LD,Blood; Future    CBC and differential; Future    Leukemia/Lymphoma flow cytometry    NM PET CT skull base to mid thigh; Future

## 2025-01-09 NOTE — PROGRESS NOTES
Name: Jaime Carranza      : 1980      MRN: [id number]  Encounter Provider: GINA Flores  Encounter Date: 2025   Encounter department: [facility] [place]  :  Assessment & Plan  Mediastinal lymphadenopathy    Orders:    Ambulatory Referral to Hematology / Oncology    LD,Blood; Future    CBC and differential; Future    Leukemia/Lymphoma flow cytometry    NM PET CT skull base to mid thigh; Future    Family history of glucose-6-phosphate dehydrogenase (G6PD) deficiency    Orders:    Glucose 6-Phosphate Dehydrogenase (G6PD), Quantitative, Blood and Hemoglobin; Future    44-year-old male with mediastinal lymphadenopathy.  Informed him that we will await biopsy results, at that time he will meet with one of her oncologist to review the results and offer treatment if appropriate.    In the meantime, we will obtain a PET/CT with mediastinal, right supraclavicular, periesophageal lymphadenopathy.  In addition, we will repeat CBCD and obtain LDH, flow cytometry prior to his office visit with Dr. Tomlinson on 2024.    Hereports this was found to have a G6PD deficiency and he would like to be tested for it.  Order placed.    Informed patient we will fill out the paperwork for his child support for court appearance within 5 days.  It will be mailed to him and also uploaded to his [employer].    Will see patient back in the office on 2024 with Dr. Tomlinson at the [facility].  Patient has no additional questions at this time.  He is aware to contact us for any additional questions/concerns.      History of Present Illness   Chief Complaint   Patient presents with    Consult   Jaime Carranza is a 44-year-old male who presents for initial consultation of his mediastinal lymphadenopathy.  He has no significant past medical history.  He works in construction.  He is here with his sister, Stanley and niece, Ángela.  He prefers his niece to provide translation as he is primarily  Vincentian-speaking.    Patient reports he started having upper chest pain November 2024 and has progressively gotten worse, currently his pain is 7 out of 10 on a 0-10 pain scale, 10 being the highest.  He is not taking any medication for the pain.    On 1/2/2024, patient presented to the ED for 3-week history of chest pain have epigastric pain which radiates to his shoulders and back.    A CT of the chest abdomen pelvis revealed the following:  Mediastinal lymphadenopathy  3.8 x 2.7 cm right lower paratracheal lymph node (image 36).  2.0 x 2.0 cm prevascular space lymph node posteriorly (image 43).  5.1 x 2.9 cm subcarinal lymph node/conglomerate (image 56).  2.4 x 1.6 cm right hilar lymph node (image 58).  2.6 x 2.2 cm left hilar lymph node on conglomerate (image 62).  3.0 x 2.0 cm lower paraesophageal lymph node   Enlarged intercostal lymph node in the right 10th intercostal space posteriorly, measuring 1.2 x 0.7 cm   There is a borderline enlarged left supraclavicular lymph node measuring 9 mm in short axis.   Liver: unremarkable  Spleen: Unremarkable    Since November, patient has noticed his appetite is decreased and he has lost about 6 pounds.  He is also having hot flashes but no drenching night sweats.  Denies any fevers, frequent infections, abdominal pain, headaches, blurry vision. Patient denies abnormal bleeding: epistaxis, gingival bleeding, hematuria, dark tarry stools.    Patient is requesting paperwork be filled out for court for his child support.      Patient reports his son was found to have G6PD deficiency and would like to be tested for it as well.    He is a non-smoker, drinks alcohol socially.    Family history:  Paternal cousin = cervical cancer      Review of Systems   Constitutional:  Positive for activity change, appetite change, fatigue and unexpected weight change. Negative for diaphoresis and fever.   HENT:  Negative for nosebleeds.    Eyes:  Negative for visual disturbance.  "  Respiratory:  Negative for shortness of breath.    Cardiovascular:  Positive for chest pain. Negative for palpitations and leg swelling.   Gastrointestinal:  Positive for constipation. Negative for abdominal pain.   Endocrine: Negative.    Genitourinary: Negative.  Negative for hematuria.   Musculoskeletal: Negative.    Skin: Negative.    Neurological:  Negative for dizziness, weakness, light-headedness and headaches.   Hematological: Negative.              Objective   /80 (BP Location: Left arm, Patient Position: Sitting, Cuff Size: Adult)   Pulse 69   Temp 97.9 °F (36.6 °C) (Temporal)   Resp 16   Ht 5' 9\" (1.753 m)   Wt 86.6 kg (191 lb)   SpO2 95%   BMI 28.21 kg/m²     Physical Exam  Constitutional:       Appearance: Normal appearance.   HENT:      Head: Normocephalic and atraumatic.   Cardiovascular:      Rate and Rhythm: Regular rhythm.      Heart sounds: Normal heart sounds.   Pulmonary:      Breath sounds: Normal breath sounds.   Abdominal:      General: There is no distension.      Palpations: Abdomen is soft. There is no hepatomegaly or splenomegaly.      Tenderness: There is no guarding.   Musculoskeletal:      Cervical back: Normal range of motion.      Right lower leg: No edema.      Left lower leg: No edema.   Lymphadenopathy:      Head:      Right side of head: No submental, submandibular or posterior auricular adenopathy.      Left side of head: No submental, submandibular or posterior auricular adenopathy.      Cervical:      Right cervical: No superficial cervical adenopathy.     Left cervical: No superficial cervical adenopathy.      Upper Body:      Right upper body: No supraclavicular or axillary adenopathy.      Left upper body: No supraclavicular or axillary adenopathy.   Skin:     General: Skin is warm and dry.   Neurological:      Mental Status: He is alert and oriented to person, place, and time.   Psychiatric:         Mood and Affect: Mood normal.         Behavior: Behavior " normal.         Thought Content: Thought content normal.         Judgment: Judgment normal.         Labs: I have reviewed the following labs:  Results for orders placed or performed during the hospital encounter of 01/02/25   CBC and differential   Result Value Ref Range    WBC 5.51 4.31 - 10.16 Thousand/uL    RBC 5.16 3.88 - 5.62 Million/uL    Hemoglobin 14.1 12.0 - 17.0 g/dL    Hematocrit 43.0 36.5 - 49.3 %    MCV 83 82 - 98 fL    MCH 27.3 26.8 - 34.3 pg    MCHC 32.8 31.4 - 37.4 g/dL    RDW 12.9 11.6 - 15.1 %    MPV 10.9 8.9 - 12.7 fL    Platelets 268 149 - 390 Thousands/uL    nRBC 0 /100 WBCs    Segmented % 57 43 - 75 %    Immature Grans % 0 0 - 2 %    Lymphocytes % 27 14 - 44 %    Monocytes % 12 4 - 12 %    Eosinophils Relative 3 0 - 6 %    Basophils Relative 1 0 - 1 %    Absolute Neutrophils 3.14 1.85 - 7.62 Thousands/µL    Absolute Immature Grans 0.01 0.00 - 0.20 Thousand/uL    Absolute Lymphocytes 1.49 0.60 - 4.47 Thousands/µL    Absolute Monocytes 0.67 0.17 - 1.22 Thousand/µL    Eosinophils Absolute 0.17 0.00 - 0.61 Thousand/µL    Basophils Absolute 0.03 0.00 - 0.10 Thousands/µL   Protime-INR   Result Value Ref Range    Protime 13.3 12.3 - 15.0 seconds    INR 0.96 0.85 - 1.19   APTT   Result Value Ref Range    PTT 30 23 - 34 seconds   Comprehensive metabolic panel   Result Value Ref Range    Sodium 137 135 - 147 mmol/L    Potassium 3.6 3.5 - 5.3 mmol/L    Chloride 100 96 - 108 mmol/L    CO2 31 21 - 32 mmol/L    ANION GAP 6 4 - 13 mmol/L    BUN 17 5 - 25 mg/dL    Creatinine 1.31 (H) 0.60 - 1.30 mg/dL    Glucose 90 65 - 140 mg/dL    Calcium 9.8 8.4 - 10.2 mg/dL    AST 22 13 - 39 U/L    ALT 31 7 - 52 U/L    Alkaline Phosphatase 85 34 - 104 U/L    Total Protein 7.8 6.4 - 8.4 g/dL    Albumin 4.5 3.5 - 5.0 g/dL    Total Bilirubin 0.46 0.20 - 1.00 mg/dL    eGFR 66 ml/min/1.73sq m   TSH, 3rd generation with Free T4 reflex   Result Value Ref Range    TSH 3RD GENERATON 0.993 0.450 - 4.500 uIU/mL   Result Value Ref  "Range    Magnesium 2.0 1.9 - 2.7 mg/dL   Result Value Ref Range    Lipase 20 11 - 82 u/L   Lactic acid, plasma (w/reflex if result > 2.0)   Result Value Ref Range    LACTIC ACID 0.9 0.5 - 2.0 mmol/L   HS Troponin 0hr (reflex protocol)   Result Value Ref Range    hs TnI 0hr 3 \"Refer to ACS Flowchart\"- see link ng/L   D-Dimer   Result Value Ref Range    D-Dimer, Quant 0.40 <0.50 ug/ml FEU   UA w Reflex to Microscopic w Reflex to Culture    Specimen: Urine, Clean Catch   Result Value Ref Range    Color, UA Colorless     Clarity, UA Clear     Specific Gravity, UA <1.005 (L) 1.005 - 1.030    pH, UA 6.5 4.5, 5.0, 5.5, 6.0, 6.5, 7.0, 7.5, 8.0    Leukocytes, UA Negative Negative    Nitrite, UA Negative Negative    Protein, UA Negative Negative mg/dl    Glucose, UA Negative Negative mg/dl    Ketones, UA Negative Negative mg/dl    Urobilinogen, UA <2.0 <2.0 mg/dl mg/dl    Bilirubin, UA Negative Negative    Occult Blood, UA Negative Negative   Beta Hydroxybutyrate   Result Value Ref Range    Beta- Hydroxybutyrate <0.05 0.02 - 0.27 mmol/L   Result Value Ref Range    Procalcitonin 0.09 <=0.25 ng/ml   HS Troponin I 2hr   Result Value Ref Range    hs TnI 2hr 3 \"Refer to ACS Flowchart\"- see link ng/L    Delta 2hr hsTnI 0 <20 ng/L   HS Troponin I 4hr   Result Value Ref Range    hs TnI 4hr 3 \"Refer to ACS Flowchart\"- see link ng/L    Delta 4hr hsTnI 0 <20 ng/L     I spent 50 minutes in chart review, counseling, coordination of care, and documentation.    This note has been generated by voice recognition software system.  Therefore, there may be spelling, grammar, and or syntax errors. Please contact if questions arise.      "

## 2025-01-15 ENCOUNTER — TELEPHONE (OUTPATIENT)
Age: 45
End: 2025-01-15

## 2025-01-15 NOTE — TELEPHONE ENCOUNTER
Patients called called back with him to ask questions about the denial on the PCT CT. Insurance advised them that they would need to contact us to see what the next step is for him. They said he may need other imaging prior in order for them to approve the PET

## 2025-01-15 NOTE — TELEPHONE ENCOUNTER
"Spoke with patient using  service,  ID #[id number].  Formed him that he does not require any additional imaging as he had a CT chest abdomen pelvis.  Informed him that his insurance did not approve the PET/CT because he does not have a cancer diagnosis.  He should proceed with the pulmonology appointment and his biopsy scheduled for 1/24/2025.  He will then meet with Dr. Tomlinson on 2/11/2025, should he have a diagnosis of cancer, at that time, Dr. Tomlinson will determine if he needs the PET scan or not.  Patient insisted that he spoke with his insurance and they \"said they would approve everything.\"  Informed patient that I had spoken with the physician at his insurance company to do peer to peer and they denied the CT scan.  Again, they said they require a cancer diagnosis.  Patient voiced understanding.  "

## 2025-01-18 ENCOUNTER — APPOINTMENT (OUTPATIENT)
Dept: LAB | Facility: CLINIC | Age: 45
End: 2025-01-18
Payer: COMMERCIAL

## 2025-01-18 DIAGNOSIS — R59.0 MEDIASTINAL LYMPHADENOPATHY: ICD-10-CM

## 2025-01-18 DIAGNOSIS — Z83.2 FAMILY HISTORY OF GLUCOSE-6-PHOSPHATE DEHYDROGENASE (G6PD) DEFICIENCY: ICD-10-CM

## 2025-01-18 LAB
BASOPHILS # BLD AUTO: 0.04 THOUSANDS/ΜL (ref 0–0.1)
BASOPHILS NFR BLD AUTO: 1 % (ref 0–1)
EOSINOPHIL # BLD AUTO: 0.26 THOUSAND/ΜL (ref 0–0.61)
EOSINOPHIL NFR BLD AUTO: 5 % (ref 0–6)
ERYTHROCYTE [DISTWIDTH] IN BLOOD BY AUTOMATED COUNT: 13.4 % (ref 11.6–15.1)
HCT VFR BLD AUTO: 42 % (ref 36.5–49.3)
HGB BLD-MCNC: 13.9 G/DL (ref 12–17)
IMM GRANULOCYTES # BLD AUTO: 0.01 THOUSAND/UL (ref 0–0.2)
IMM GRANULOCYTES NFR BLD AUTO: 0 % (ref 0–2)
LDH SERPL-CCNC: 197 U/L (ref 140–271)
LYMPHOCYTES # BLD AUTO: 1.85 THOUSANDS/ΜL (ref 0.6–4.47)
LYMPHOCYTES NFR BLD AUTO: 38 % (ref 14–44)
MCH RBC QN AUTO: 28 PG (ref 26.8–34.3)
MCHC RBC AUTO-ENTMCNC: 33.1 G/DL (ref 31.4–37.4)
MCV RBC AUTO: 85 FL (ref 82–98)
MONOCYTES # BLD AUTO: 0.69 THOUSAND/ΜL (ref 0.17–1.22)
MONOCYTES NFR BLD AUTO: 14 % (ref 4–12)
NEUTROPHILS # BLD AUTO: 2.02 THOUSANDS/ΜL (ref 1.85–7.62)
NEUTS SEG NFR BLD AUTO: 42 % (ref 43–75)
NRBC BLD AUTO-RTO: 0 /100 WBCS
PLATELET # BLD AUTO: 267 THOUSANDS/UL (ref 149–390)
PMV BLD AUTO: 10.8 FL (ref 8.9–12.7)
RBC # BLD AUTO: 4.96 MILLION/UL (ref 3.88–5.62)
WBC # BLD AUTO: 4.87 THOUSAND/UL (ref 4.31–10.16)

## 2025-01-18 PROCEDURE — 83615 LACTATE (LD) (LDH) ENZYME: CPT

## 2025-01-18 PROCEDURE — 36415 COLL VENOUS BLD VENIPUNCTURE: CPT

## 2025-01-18 PROCEDURE — 85025 COMPLETE CBC W/AUTO DIFF WBC: CPT

## 2025-01-18 PROCEDURE — 88185 FLOWCYTOMETRY/TC ADD-ON: CPT

## 2025-01-18 PROCEDURE — 82955 ASSAY OF G6PD ENZYME: CPT

## 2025-01-20 LAB
G6PD BLD QN: 225 U/10E12 RBC (ref 127–427)
RBC # BLD AUTO: 5.08 X10E6/UL (ref 4.14–5.8)

## 2025-01-22 ENCOUNTER — CONSULT (OUTPATIENT)
Dept: PULMONOLOGY | Facility: CLINIC | Age: 45
End: 2025-01-22
Payer: COMMERCIAL

## 2025-01-22 VITALS
SYSTOLIC BLOOD PRESSURE: 148 MMHG | TEMPERATURE: 97.7 F | OXYGEN SATURATION: 99 % | HEIGHT: 69 IN | BODY MASS INDEX: 26.6 KG/M2 | WEIGHT: 179.6 LBS | DIASTOLIC BLOOD PRESSURE: 79 MMHG

## 2025-01-22 DIAGNOSIS — R91.8 PULMONARY NODULES: ICD-10-CM

## 2025-01-22 DIAGNOSIS — N50.89 TESTICLE LUMP: ICD-10-CM

## 2025-01-22 DIAGNOSIS — R59.0 MEDIASTINAL LYMPHADENOPATHY: Primary | ICD-10-CM

## 2025-01-22 LAB — SCAN RESULT: NORMAL

## 2025-01-22 PROCEDURE — 99244 OFF/OP CNSLTJ NEW/EST MOD 40: CPT

## 2025-01-22 RX ORDER — FENTANYL CITRATE/PF 50 MCG/ML
25 SYRINGE (ML) INJECTION
Refills: 0 | Status: CANCELLED | OUTPATIENT
Start: 2025-01-22

## 2025-01-22 RX ORDER — SODIUM CHLORIDE 9 MG/ML
125 INJECTION, SOLUTION INTRAVENOUS CONTINUOUS
Status: CANCELLED | OUTPATIENT
Start: 2025-01-22

## 2025-01-22 RX ORDER — ONDANSETRON 2 MG/ML
4 INJECTION INTRAMUSCULAR; INTRAVENOUS ONCE AS NEEDED
Status: CANCELLED | OUTPATIENT
Start: 2025-01-22

## 2025-01-22 RX ORDER — MEPERIDINE HYDROCHLORIDE 25 MG/ML
12.5 INJECTION INTRAMUSCULAR; INTRAVENOUS; SUBCUTANEOUS
Status: CANCELLED | OUTPATIENT
Start: 2025-01-22

## 2025-01-22 NOTE — PROGRESS NOTES
Consultation - Pulmonary Medicine   Jaime Carranza 44 y.o. male MRN: [id number]    Physician Requesting Consult: Jeramie Tomlinson DO  Reason for Consult: Mediastinal lymphadenopathy  Jaime Carranza is a 44 y.o. male with PMHx of G6PD deficiency who presents for pulmonary evaluation.    Mediastinal Lymphadenopathy  - The patient presented with chest pain and was found to have large bilateral hilar and mediastinal lymphadenopathy with gastrohepatic ligament lymphadenopathy as well.  Concern is for malignancy particularly lymphoma although metastasis remains on the differential and we discussed this in the office today.  - Patient is scheduled for EBUS on Friday 1/24/2025, we reviewed the procedure and recommendations for n.p.o. after midnight.  He does not have any medications of concern in regards to the procedure.  - PET/CT has been declined by his insurance until biopsy is completed, but it was ordered by oncology.  - He has follow-up with Dr. Negro scheduled for 2/11/2025  - Follow up in 6-8 weeks.  -     Ambulatory Referral to Pulmonology    Testicle Lump  - During today's visit the patient notes in December he felt a lump in one of his testicles, given concern for malignancy will order testicular ultrasound and make oncology aware.  -     US scrotum and groin area; Future    Pulmonary Nodules  - Two 4 mm pulmonary nodules noted on most recent CT, will schedule 3 month follow-up CT.  -     CT chest wo contrast; Future    Thank you for allowing me to participate in the care of your patient.  If there are any questions regarding evaluation please feel free to reach out.     Return in about 2 months (around 3/22/2025).  ______________________________________________________________________    HPI:    Jaime Carranza is a 44 y.o. male with PMHx of G6PD deficiency who presents for pulmonary evaluation.  The patient presented to the ED at [facility] on 1/2/2025 at which time he had chest pain and ultimately he had a CT C/A/P  which showed diffuse lymphadenopathy (mediastinal, B/L hilar, intercostal, supraclavicular, gastrohepatic).  He was referred to hematology due to concern for potential malignancy and recommendation was for evaluation by pulmonary for EBUS.  The patient is scheduled for EBUS on 1/24/2025 at [place].  Imaging was also remarkable for two 4 mm pulmonary nodules in RLL and MILDRED without any prior imaging for comparison.  He denies any pulmonary symptoms at this time including dyspnea, cough, hemoptysis or wheezing.  He also denies B symptoms at this time including F/C, unintentional weight loss and night sweats.  He does note continued chest pain that begins in the center of his chest and radiates out under bilateral ribs, but states it is unchanged in quality.  He denies any palpitations, pain radiating to the jaw or shoulder, syncope or pedal edema.  He notes he for started having the symptoms in the middle of November and he worsened in December.  He does not state that in December he noticed a lump on one of his testicles.    The patient was present today with 2 family members 1 of which provide interpretation per the patient's request.    Tobacco/Vaping: denies any history of tobacco use including vaping/cigars.  He does note 2nd hand exposure from his father smoking.  Work Hx: not currently working, previously worked in construction (dry wall, repairing boilers)  Exposure Hx: notes prior sandblasting, concrete/cement dust exposures and did use PPE.  He denies asbestos and TB exposures.  Pets: denies  Reflux Symptoms: denies  Travel Hx: [place] , came to [place] in [place], reports all travel has been local since  Family Pulmonary Hx: denies    Review of Systems:  Review of Systems  10-point system review completed, all of which are negative except as mentioned above.    Current Medications:    Current Outpatient Medications:     meloxicam (Mobic) 15 mg tablet, Take 1 tablet (15 mg total) by mouth daily for 10 days  "(Patient not taking: Reported on 1/21/2025), Disp: 10 tablet, Rfl: 0    Historical Information   Past Medical History:   Diagnosis Date    Kidney stone      Past Surgical History:   Procedure Laterality Date    LEG SURGERY Right 2014    Iron suresh in Right tibia   Social History   Social History     Tobacco Use   Smoking Status Never    Passive exposure: Never   Smokeless Tobacco Never     Family History:   History reviewed. No pertinent family history.    PhysicalExamination:  Vitals:   /79   Temp 97.7 °F (36.5 °C) (Temporal)   Ht 5' 9\" (1.753 m)   Wt 81.5 kg (179 lb 9.6 oz)   SpO2 99%   BMI 26.52 kg/m²   Body mass index is 26.52 kg/m².    Constitutional: NAD, well appearing, on room air, no conversational dyspnea   Skin: Warm, dry, no rashes noted   Eyes: PERRL, normal conjunctiva  ENT: Nasal congestion absent, moist mucus membranes.  Neck: No JVD, trachea is midline, no adenopathy.  Resp: CTA B/L, no W/R/R   Cardiac: RRR, +S1/S2, no M/R/G  Extremities: No digital clubbing or pedal edema  Neuro: AAOx3    Diagnostic Data:  Labs:  I personally reviewed the most recent laboratory data pertinent to today's visit    Lab Results   Component Value Date    WBC 4.87 01/18/2025    HGB 13.9 01/18/2025    HCT 42.0 01/18/2025    MCV 85 01/18/2025     01/18/2025     Lab Results   Component Value Date    CALCIUM 9.8 01/02/2025    K 3.6 01/02/2025    CO2 31 01/02/2025     01/02/2025    BUN 17 01/02/2025    CREATININE 1.31 (H) 01/02/2025     No results found for: \"IGE\"  Lab Results   Component Value Date    ALT 31 01/02/2025    AST 22 01/02/2025    ALKPHOS 85 01/02/2025     PFT results:  None    Imaging:  I personally reviewed the images in PACS pertinent to today's visit:  CTA Dissection Protocol C/A/P WWO -- 1/2/2025  1) No thoracic or abdominal aortic aneurysm, intramural hematoma or dissection.  2) Lymphadenopathy in the mediastinal, bilateral hilar, lower paraesophageal and gastrohepatic ligament chains " "and borderline enlarged left supraclavicular lymph node, as detailed above. This is concerning for lymphoma, or less likely metastatic disease. No definite primary malignancy identified within limitation of CT technique.   3) Two nonspecific pulmonary nodules, the larger measuring 4 mm. Recommend reevaluation with chest CT in 3 months as metastatic disease cannot be entirely excluded.  4) Two hyperdense peripheral foci in the left hepatic lobe at the dome, measuring up to 4 mm, statistically likely flash filling hemangiomas or arterioportal shunts. Recommend attention on follow-up for confirmation with abdominal MRI (without and with IV contrast).  5) Additional findings as above.    Other studies:  None      Abi Estevez MD  Pulmonary-Critical Care and Sleep Medicine  01/22/25    Portions of the record may have been created with voice recognition software. Occasional wrong word or \"sound a like\" substitutions may have occurred due to the inherent limitations of voice recognition software. Please read the chart carefully and recognize, using context, where substitutions have occurred.  "

## 2025-01-22 NOTE — H&P (VIEW-ONLY)
Consultation - Pulmonary Medicine   Jaime Carranza 44 y.o. male MRN: [id number]    Physician Requesting Consult: Jeramie Tomlinson DO  Reason for Consult: Mediastinal lymphadenopathy  Jaime Carranza is a 44 y.o. male with PMHx of G6PD deficiency who presents for pulmonary evaluation.    Mediastinal Lymphadenopathy  - The patient presented with chest pain and was found to have large bilateral hilar and mediastinal lymphadenopathy with gastrohepatic ligament lymphadenopathy as well.  Concern is for malignancy particularly lymphoma although metastasis remains on the differential and we discussed this in the office today.  - Patient is scheduled for EBUS on Friday 1/24/2025, we reviewed the procedure and recommendations for n.p.o. after midnight.  He does not have any medications of concern in regards to the procedure.  - PET/CT has been declined by his insurance until biopsy is completed, but it was ordered by oncology.  - He has follow-up with Dr. Negro scheduled for 2/11/2025  - Follow up in 6-8 weeks.  -     Ambulatory Referral to Pulmonology    Testicle Lump  - During today's visit the patient notes in December he felt a lump in one of his testicles, given concern for malignancy will order testicular ultrasound and make oncology aware.  -     US scrotum and groin area; Future    Pulmonary Nodules  - Two 4 mm pulmonary nodules noted on most recent CT, will schedule 3 month follow-up CT.  -     CT chest wo contrast; Future    Thank you for allowing me to participate in the care of your patient.  If there are any questions regarding evaluation please feel free to reach out.     Return in about 2 months (around 3/22/2025).  ______________________________________________________________________    HPI:    Jaime Carranza is a 44 y.o. male with PMHx of G6PD deficiency who presents for pulmonary evaluation.  The patient presented to the ED at [facility] on 1/2/2025 at which time he had chest pain and ultimately he had a CT C/A/P  which showed diffuse lymphadenopathy (mediastinal, B/L hilar, intercostal, supraclavicular, gastrohepatic).  He was referred to hematology due to concern for potential malignancy and recommendation was for evaluation by pulmonary for EBUS.  The patient is scheduled for EBUS on 1/24/2025 at [facility].  Imaging was also remarkable for two 4 mm pulmonary nodules in RLL and MILDRED without any prior imaging for comparison.  He denies any pulmonary symptoms at this time including dyspnea, cough, hemoptysis or wheezing.  He also denies B symptoms at this time including F/C, unintentional weight loss and night sweats.  He does note continued chest pain that begins in the center of his chest and radiates out under bilateral ribs, but states it is unchanged in quality.  He denies any palpitations, pain radiating to the jaw or shoulder, syncope or pedal edema.  He notes he for started having the symptoms in the middle of November and he worsened in December.  He does not state that in December he noticed a lump on one of his testicles.    The patient was present today with 2 family members 1 of which provide interpretation per the patient's request.    Tobacco/Vaping: denies any history of tobacco use including vaping/cigars.  He does note 2nd hand exposure from his father smoking.  Work Hx: not currently working, previously worked in construction (dry wall, repairing boilers)  Exposure Hx: notes prior sandblasting, concrete/cement dust exposures and did use PPE.  He denies asbestos and TB exposures.  Pets: denies  Reflux Symptoms: denies  Travel Hx: [place], reports all travel has been local since  Family Pulmonary Hx: denies    Review of Systems:  Review of Systems  10-point system review completed, all of which are negative except as mentioned above.    Current Medications:    Current Outpatient Medications:     meloxicam (Mobic) 15 mg tablet, Take 1 tablet (15 mg total) by mouth daily for 10 days  "(Patient not taking: Reported on 1/21/2025), Disp: 10 tablet, Rfl: 0    Historical Information   Past Medical History:   Diagnosis Date    Kidney stone      Past Surgical History:   Procedure Laterality Date    LEG SURGERY Right 2014    Iron suresh in Right tibia   Social History   Social History     Tobacco Use   Smoking Status Never    Passive exposure: Never   Smokeless Tobacco Never     Family History:   History reviewed. No pertinent family history.    PhysicalExamination:  Vitals:   /79   Temp 97.7 °F (36.5 °C) (Temporal)   Ht 5' 9\" (1.753 m)   Wt 81.5 kg (179 lb 9.6 oz)   SpO2 99%   BMI 26.52 kg/m²   Body mass index is 26.52 kg/m².    Constitutional: NAD, well appearing, on room air, no conversational dyspnea   Skin: Warm, dry, no rashes noted   Eyes: PERRL, normal conjunctiva  ENT: Nasal congestion absent, moist mucus membranes.  Neck: No JVD, trachea is midline, no adenopathy.  Resp: CTA B/L, no W/R/R   Cardiac: RRR, +S1/S2, no M/R/G  Extremities: No digital clubbing or pedal edema  Neuro: AAOx3    Diagnostic Data:  Labs:  I personally reviewed the most recent laboratory data pertinent to today's visit    Lab Results   Component Value Date    WBC 4.87 01/18/2025    HGB 13.9 01/18/2025    HCT 42.0 01/18/2025    MCV 85 01/18/2025     01/18/2025     Lab Results   Component Value Date    CALCIUM 9.8 01/02/2025    K 3.6 01/02/2025    CO2 31 01/02/2025     01/02/2025    BUN 17 01/02/2025    CREATININE 1.31 (H) 01/02/2025     No results found for: \"IGE\"  Lab Results   Component Value Date    ALT 31 01/02/2025    AST 22 01/02/2025    ALKPHOS 85 01/02/2025     PFT results:  None    Imaging:  I personally reviewed the images in PACS pertinent to today's visit:  CTA Dissection Protocol C/A/P WWO -- 1/2/2025  1) No thoracic or abdominal aortic aneurysm, intramural hematoma or dissection.  2) Lymphadenopathy in the mediastinal, bilateral hilar, lower paraesophageal and gastrohepatic ligament chains " "and borderline enlarged left supraclavicular lymph node, as detailed above. This is concerning for lymphoma, or less likely metastatic disease. No definite primary malignancy identified within limitation of CT technique.   3) Two nonspecific pulmonary nodules, the larger measuring 4 mm. Recommend reevaluation with chest CT in 3 months as metastatic disease cannot be entirely excluded.  4) Two hyperdense peripheral foci in the left hepatic lobe at the dome, measuring up to 4 mm, statistically likely flash filling hemangiomas or arterioportal shunts. Recommend attention on follow-up for confirmation with abdominal MRI (without and with IV contrast).  5) Additional findings as above.    Other studies:  None      Abi Estevez MD  Pulmonary-Critical Care and Sleep Medicine  01/22/25    Portions of the record may have been created with voice recognition software. Occasional wrong word or \"sound a like\" substitutions may have occurred due to the inherent limitations of voice recognition software. Please read the chart carefully and recognize, using context, where substitutions have occurred.  "

## 2025-01-23 NOTE — TELEPHONE ENCOUNTER
Patient was seen in office yesterday 01/22. Office note states procedure was discussed again along with instructions. Patient is all set     Thank You

## 2025-01-24 ENCOUNTER — ANESTHESIA (OUTPATIENT)
Dept: PERIOP | Facility: HOSPITAL | Age: 45
End: 2025-01-24
Payer: COMMERCIAL

## 2025-01-24 ENCOUNTER — ANESTHESIA EVENT (OUTPATIENT)
Dept: PERIOP | Facility: HOSPITAL | Age: 45
End: 2025-01-24
Payer: COMMERCIAL

## 2025-01-24 ENCOUNTER — HOSPITAL ENCOUNTER (OUTPATIENT)
Dept: PERIOP | Facility: HOSPITAL | Age: 45
Setting detail: OUTPATIENT SURGERY
End: 2025-01-24
Attending: INTERNAL MEDICINE
Payer: COMMERCIAL

## 2025-01-24 VITALS
OXYGEN SATURATION: 100 % | HEART RATE: 61 BPM | TEMPERATURE: 97.6 F | SYSTOLIC BLOOD PRESSURE: 143 MMHG | RESPIRATION RATE: 16 BRPM | DIASTOLIC BLOOD PRESSURE: 79 MMHG

## 2025-01-24 DIAGNOSIS — R59.0 MEDIASTINAL LYMPHADENOPATHY: ICD-10-CM

## 2025-01-24 LAB
LYMPHOCYTES NFR BLD AUTO: 29 %
MACROPHAGES NFR FLD: 70 %
NEUTS SEG NFR BLD AUTO: 1 %
PATH REV: NO
TOTAL CELLS COUNTED SPEC: 100

## 2025-01-24 PROCEDURE — 88305 TISSUE EXAM BY PATHOLOGIST: CPT | Performed by: PATHOLOGY

## 2025-01-24 PROCEDURE — 88184 FLOWCYTOMETRY/ TC 1 MARKER: CPT | Performed by: INTERNAL MEDICINE

## 2025-01-24 PROCEDURE — 87102 FUNGUS ISOLATION CULTURE: CPT | Performed by: INTERNAL MEDICINE

## 2025-01-24 PROCEDURE — 88185 FLOWCYTOMETRY/TC ADD-ON: CPT

## 2025-01-24 PROCEDURE — 88185 FLOWCYTOMETRY/TC ADD-ON: CPT | Performed by: INTERNAL MEDICINE

## 2025-01-24 PROCEDURE — 88341 IMHCHEM/IMCYTCHM EA ADD ANTB: CPT | Performed by: PATHOLOGY

## 2025-01-24 PROCEDURE — 88173 CYTOPATH EVAL FNA REPORT: CPT | Performed by: PATHOLOGY

## 2025-01-24 PROCEDURE — 88112 CYTOPATH CELL ENHANCE TECH: CPT | Performed by: PATHOLOGY

## 2025-01-24 PROCEDURE — 87116 MYCOBACTERIA CULTURE: CPT | Performed by: INTERNAL MEDICINE

## 2025-01-24 PROCEDURE — 87206 SMEAR FLUORESCENT/ACID STAI: CPT | Performed by: INTERNAL MEDICINE

## 2025-01-24 PROCEDURE — 88312 SPECIAL STAINS GROUP 1: CPT | Performed by: PATHOLOGY

## 2025-01-24 PROCEDURE — 89051 BODY FLUID CELL COUNT: CPT | Performed by: STUDENT IN AN ORGANIZED HEALTH CARE EDUCATION/TRAINING PROGRAM

## 2025-01-24 PROCEDURE — 88172 CYTP DX EVAL FNA 1ST EA SITE: CPT | Performed by: PATHOLOGY

## 2025-01-24 PROCEDURE — 88342 IMHCHEM/IMCYTCHM 1ST ANTB: CPT | Performed by: PATHOLOGY

## 2025-01-24 PROCEDURE — 87205 SMEAR GRAM STAIN: CPT | Performed by: INTERNAL MEDICINE

## 2025-01-24 PROCEDURE — 87070 CULTURE OTHR SPECIMN AEROBIC: CPT | Performed by: INTERNAL MEDICINE

## 2025-01-24 RX ORDER — KETAMINE HCL IN NACL, ISO-OSM 100MG/10ML
SYRINGE (ML) INJECTION AS NEEDED
Status: DISCONTINUED | OUTPATIENT
Start: 2025-01-24 | End: 2025-01-24

## 2025-01-24 RX ORDER — PROPOFOL 10 MG/ML
INJECTION, EMULSION INTRAVENOUS AS NEEDED
Status: DISCONTINUED | OUTPATIENT
Start: 2025-01-24 | End: 2025-01-24

## 2025-01-24 RX ORDER — PHENYLEPHRINE HCL IN 0.9% NACL 1 MG/10 ML
SYRINGE (ML) INTRAVENOUS AS NEEDED
Status: DISCONTINUED | OUTPATIENT
Start: 2025-01-24 | End: 2025-01-24

## 2025-01-24 RX ORDER — DEXAMETHASONE SODIUM PHOSPHATE 10 MG/ML
INJECTION, SOLUTION INTRAMUSCULAR; INTRAVENOUS AS NEEDED
Status: DISCONTINUED | OUTPATIENT
Start: 2025-01-24 | End: 2025-01-24

## 2025-01-24 RX ORDER — ONDANSETRON 2 MG/ML
4 INJECTION INTRAMUSCULAR; INTRAVENOUS ONCE AS NEEDED
Status: DISCONTINUED | OUTPATIENT
Start: 2025-01-24 | End: 2025-01-24 | Stop reason: HOSPADM

## 2025-01-24 RX ORDER — SUCCINYLCHOLINE/SOD CL,ISO/PF 100 MG/5ML
SYRINGE (ML) INTRAVENOUS AS NEEDED
Status: DISCONTINUED | OUTPATIENT
Start: 2025-01-24 | End: 2025-01-24

## 2025-01-24 RX ORDER — MEPERIDINE HYDROCHLORIDE 25 MG/ML
12.5 INJECTION INTRAMUSCULAR; INTRAVENOUS; SUBCUTANEOUS
Status: DISCONTINUED | OUTPATIENT
Start: 2025-01-24 | End: 2025-01-24 | Stop reason: HOSPADM

## 2025-01-24 RX ORDER — SODIUM CHLORIDE 9 MG/ML
125 INJECTION, SOLUTION INTRAVENOUS CONTINUOUS
Status: DISPENSED | OUTPATIENT
Start: 2025-01-24

## 2025-01-24 RX ORDER — MIDAZOLAM HYDROCHLORIDE 2 MG/2ML
INJECTION, SOLUTION INTRAMUSCULAR; INTRAVENOUS AS NEEDED
Status: DISCONTINUED | OUTPATIENT
Start: 2025-01-24 | End: 2025-01-24

## 2025-01-24 RX ORDER — FENTANYL CITRATE/PF 50 MCG/ML
25 SYRINGE (ML) INJECTION
Refills: 0 | Status: DISCONTINUED | OUTPATIENT
Start: 2025-01-24 | End: 2025-01-24 | Stop reason: HOSPADM

## 2025-01-24 RX ORDER — ONDANSETRON 2 MG/ML
INJECTION INTRAMUSCULAR; INTRAVENOUS AS NEEDED
Status: DISCONTINUED | OUTPATIENT
Start: 2025-01-24 | End: 2025-01-24

## 2025-01-24 RX ADMIN — Medication 100 MCG: at 08:37

## 2025-01-24 RX ADMIN — SODIUM CHLORIDE 0.15 MCG/KG/MIN: 9 INJECTION, SOLUTION INTRAVENOUS at 07:46

## 2025-01-24 RX ADMIN — DEXMEDETOMIDINE HYDROCHLORIDE 8 MCG: 100 INJECTION, SOLUTION INTRAVENOUS at 07:38

## 2025-01-24 RX ADMIN — Medication 100 MCG: at 08:16

## 2025-01-24 RX ADMIN — PROPOFOL 50 MG: 10 INJECTION, EMULSION INTRAVENOUS at 07:44

## 2025-01-24 RX ADMIN — PROPOFOL 120 MCG/KG/MIN: 10 INJECTION, EMULSION INTRAVENOUS at 07:46

## 2025-01-24 RX ADMIN — Medication 100 MCG: at 07:44

## 2025-01-24 RX ADMIN — PROPOFOL 50 MG: 10 INJECTION, EMULSION INTRAVENOUS at 07:42

## 2025-01-24 RX ADMIN — Medication 100 MG: at 07:44

## 2025-01-24 RX ADMIN — MIDAZOLAM 1 MG: 1 INJECTION INTRAMUSCULAR; INTRAVENOUS at 07:38

## 2025-01-24 RX ADMIN — SODIUM CHLORIDE 100 MCG: 9 INJECTION, SOLUTION INTRAVENOUS at 07:55

## 2025-01-24 RX ADMIN — ONDANSETRON 4 MG: 2 INJECTION INTRAMUSCULAR; INTRAVENOUS at 07:57

## 2025-01-24 RX ADMIN — Medication 100 MCG: at 08:46

## 2025-01-24 RX ADMIN — SODIUM CHLORIDE: 0.9 INJECTION, SOLUTION INTRAVENOUS at 09:00

## 2025-01-24 RX ADMIN — DEXAMETHASONE SODIUM PHOSPHATE 10 MG: 10 INJECTION INTRAMUSCULAR; INTRAVENOUS at 07:56

## 2025-01-24 RX ADMIN — PROPOFOL 100 MG: 10 INJECTION, EMULSION INTRAVENOUS at 07:40

## 2025-01-24 RX ADMIN — Medication 10 MG: at 08:49

## 2025-01-24 RX ADMIN — Medication 20 MG: at 07:40

## 2025-01-24 RX ADMIN — PROPOFOL 50 MG: 10 INJECTION, EMULSION INTRAVENOUS at 08:09

## 2025-01-24 RX ADMIN — MIDAZOLAM 1 MG: 1 INJECTION INTRAMUSCULAR; INTRAVENOUS at 07:54

## 2025-01-24 RX ADMIN — SODIUM CHLORIDE 125 ML/HR: 0.9 INJECTION, SOLUTION INTRAVENOUS at 07:19

## 2025-01-24 RX ADMIN — Medication 100 MCG: at 08:25

## 2025-01-24 RX ADMIN — Medication 100 MCG: at 08:55

## 2025-01-24 NOTE — ANESTHESIA POSTPROCEDURE EVALUATION
Post-Op Assessment Note    CV Status:  Stable    Pain management: adequate       Mental Status:  Alert and awake   Hydration Status:  Euvolemic   PONV Controlled:  Controlled   Airway Patency:  Patent     Post Op Vitals Reviewed: Yes    No anethesia notable event occurred.    Staff: CRNA           Last Filed PACU Vitals:  Vitals Value Taken Time   Temp 97 °F (36.1 °C) 01/24/25 0926   Pulse 68 01/24/25 0929   BP 94/55 01/24/25 0926   Resp 17 01/24/25 0926   SpO2 99 % 01/24/25 0929   Vitals shown include unfiled device data.    Modified Jovan:     Vitals Value Taken Time   Activity 2 01/24/25 0926   Respiration 2 01/24/25 0926   Circulation 2 01/24/25 0926   Consciousness 1 01/24/25 0926   Oxygen Saturation 1 01/24/25 0926     Modified Jovan Score: 8

## 2025-01-24 NOTE — ANESTHESIA PREPROCEDURE EVALUATION
Procedure:  ENDOBRONCHIAL ULTRASOUND (EBUS)    Relevant Problems   Other   (+) Mediastinal lymphadenopathy        Physical Exam    Airway    Mallampati score: II         Dental       Cardiovascular  Rhythm: regular, Rate: normal    Pulmonary   Breath sounds clear to auscultation    Other Findings        Anesthesia Plan  ASA Score- 2     Anesthesia Type- general with ASA Monitors.         Additional Monitors:     Airway Plan:            Plan Factors-Exercise tolerance (METS): >4 METS.    Chart reviewed.   Existing labs reviewed. Patient summary reviewed.                  Induction- intravenous.    Postoperative Plan-     Perioperative Resuscitation Plan - Level 1 - Full Code.       Informed Consent- Anesthetic plan and risks discussed with patient.        NPO Status:  No vitals data found for the desired time range.

## 2025-01-24 NOTE — INTERVAL H&P NOTE
H&P reviewed. After examining the patient I find no changes in the patients condition since the H&P had been written.    HPI: Patient is a 44-year-old male with limited past medical history who initially presented around the turn of the new year for chest pain that started about a month prior.  He was also having testicular pain/could feel an abnormality on his testicle.  Patient underwent CTA dissection protocol that revealed bilateral nodules along with profound mediastinal lymphadenopathy namely at the 4R and 7 stations.  Patient was seen by hematology/oncology who proceeded with G6PD testing and flow cytometry.  G6PD testing was negative, flow cytometry showed rare circulating polytypic plasma cells.  Patient attempted to obtain PET/CT, but unable to given no tissue diagnosis.  Patient was referred to pulmonary for EBUS and tissue sampling.  Ultrasound of the scrotum is still pending.    S: Patient still reporting some chest pain and infrequent cough.  No hemoptysis.  No B symptoms.  All other review of systems negative.  Patient does not have a tobacco history.  Has a history of construction work.  No asbestos or TB exposures.  No pets at home.  Initially born in the [facility], came to the  in 2019.    O:    Vitals:    01/24/25 0652   BP: 140/89   Pulse: 68   Resp: 16   Temp: 97.5 °F (36.4 °C)   SpO2: 100%     Physical Exam   General: A and O x 4, NAD  HEENT: PERRLA, EOMI  Cardiac: Regular rate and rhythm, positive S1/S2  Lungs: Clear to auscultation bilaterally  Abdomen: Soft, nontender, nondistended  Extremities: No peripheral edema    A/P: Patient is a 44-year-old male with limited past medical history who presents today for bronchoscopy with EBUS in order to investigate mediastinal and hilar lymphadenopathy.  Differential includes: Malignancy (specifically, lymphoma), inflammatory conditions (sarcoid, vasculitides), indolent infection (NTM), pneumoconiosis, etc.  Patient is consented, plan to  proceed with procedure.  Patient is aware of risks, including: Infection, bleeding, injury to lung/surrounding tissue, pneumothorax, postprocedural hypoxia.    Wilfred Maza D.O.  PGY-5, Pulmonary/Critical Care  [facility]

## 2025-01-25 LAB — RHODAMINE-AURAMINE STN SPEC: NORMAL

## 2025-01-26 LAB
BACTERIA BRONCH AEROBE CULT: NORMAL
BACTERIA TISS AEROBE CULT: NO GROWTH
GRAM STN SPEC: NORMAL

## 2025-01-27 ENCOUNTER — TELEPHONE (OUTPATIENT)
Dept: FAMILY MEDICINE CLINIC | Facility: CLINIC | Age: 45
End: 2025-01-27

## 2025-01-27 LAB
BACTERIA TISS AEROBE CULT: NO GROWTH
FUNGUS SPEC CULT: NORMAL
GRAM STN SPEC: NORMAL
GRAM STN SPEC: NORMAL

## 2025-01-28 LAB
MYCOBACTERIUM SPEC CULT: NORMAL
RHODAMINE-AURAMINE STN SPEC: NORMAL
SCAN RESULT: NORMAL
SCAN RESULT: NORMAL

## 2025-01-29 ENCOUNTER — OFFICE VISIT (OUTPATIENT)
Dept: FAMILY MEDICINE CLINIC | Facility: CLINIC | Age: 45
End: 2025-01-29
Payer: COMMERCIAL

## 2025-01-29 VITALS
BODY MASS INDEX: 26.76 KG/M2 | WEIGHT: 181.2 LBS | DIASTOLIC BLOOD PRESSURE: 72 MMHG | SYSTOLIC BLOOD PRESSURE: 110 MMHG | HEART RATE: 74 BPM | TEMPERATURE: 96.6 F | OXYGEN SATURATION: 99 %

## 2025-01-29 DIAGNOSIS — R59.0 MEDIASTINAL LYMPHADENOPATHY: Primary | ICD-10-CM

## 2025-01-29 DIAGNOSIS — N50.89 TESTICLE LUMP: ICD-10-CM

## 2025-01-29 PROCEDURE — 99214 OFFICE O/P EST MOD 30 MIN: CPT | Performed by: PHYSICIAN ASSISTANT

## 2025-01-29 NOTE — ASSESSMENT & PLAN NOTE
- presents for recheck of ongoing workup for mediastinal lymphadenopathy    - seen by pulm disease, had EBUS 1/24/2025, biopsy is pending    - Has upcoming appointment scheduled w/ Dr. Negro    - Says he has occcasional cough but denies any palpitations, night sweats, weight loss, nausea vomiting diarrhea   - Understandably patient anxious and wanting to know BX results but ultimately he feels that he has been coping well w/ recent diagnoses and defers any pharmacotherapy or talk therapy for depression/anxiety at this time    - Will recheck in 3 months or sooner PRN

## 2025-01-29 NOTE — PROGRESS NOTES
Name: Jaime Carranza      : 1980      MRN: [id number]  Encounter Provider: Julio Read PA-C  Encounter Date: 2025   Encounter department: [facility] PRIMARY CARE  :  Assessment & Plan  Mediastinal lymphadenopathy     - presents for recheck of ongoing workup for mediastinal lymphadenopathy    - seen by pulm disease, had EBUS 2025, biopsy is pending    - Has upcoming appointment scheduled w/ Dr. Negro    - Says he has occcasional cough but denies any palpitations, night sweats, weight loss, nausea vomiting diarrhea   - Understandably patient anxious and wanting to know BX results but ultimately he feels that he has been coping well w/ recent diagnoses and defers any pharmacotherapy or talk therapy for depression/anxiety at this time    - Will recheck in 3 months or sooner PRN        Testicle lump     - noted in 2024, having ultrasound completed tomorrow    - Denies any testicular pain, penile rash or abnormal discharge. No urinary symptoms    - Management to be dictated by ultrasound results               History of Present Illness   Jaime Carranza is a 44 y.o. male presenting today to check in regarding ongoing workup for mediastinal lymphadenopathy. Overall patient states that he is doing well, besides occasional cough. He did note new onset testicular lump in December which he is having ultrasound performed on tomorrow. He denies any recent weight loss, fatigue, nausea, vomiting, diarrhea, shortness of breath at this time. When asked about depression/anxiety, patient states that he has been dealing w/ anxiety as he awaits testing results but believes that he has been coping with it well.       Review of Systems   Constitutional:  Negative for chills and fever.   HENT:  Negative for ear pain and sore throat.    Eyes:  Negative for pain and visual disturbance.   Respiratory:  Negative for cough and shortness of breath.    Cardiovascular:  Negative for chest pain and  palpitations.   Gastrointestinal:  Negative for abdominal pain and vomiting.   Genitourinary:  Negative for dysuria and hematuria.   Musculoskeletal:  Negative for arthralgias and back pain.   Skin:  Negative for color change and rash.   Neurological:  Negative for seizures and syncope.   All other systems reviewed and are negative.      Objective   /72   Pulse 74   Temp (!) 96.6 °F (35.9 °C)   Wt 82.2 kg (181 lb 3.2 oz)   SpO2 99%   BMI 26.76 kg/m²      Physical Exam  Constitutional:       Appearance: Normal appearance.   HENT:      Head: Normocephalic.      Right Ear: External ear normal.      Left Ear: External ear normal.      Nose: Nose normal.      Mouth/Throat:      Mouth: Mucous membranes are moist.      Pharynx: Oropharynx is clear.   Eyes:      Conjunctiva/sclera: Conjunctivae normal.   Cardiovascular:      Rate and Rhythm: Normal rate and regular rhythm.      Heart sounds: Normal heart sounds.   Pulmonary:      Effort: Pulmonary effort is normal.      Breath sounds: Normal breath sounds.   Abdominal:      General: Bowel sounds are normal.      Palpations: Abdomen is soft.   Neurological:      Mental Status: He is alert and oriented to person, place, and time.   Psychiatric:         Behavior: Behavior normal.

## 2025-01-30 ENCOUNTER — HOSPITAL ENCOUNTER (OUTPATIENT)
Dept: ULTRASOUND IMAGING | Facility: HOSPITAL | Age: 45
Discharge: HOME | End: 2025-01-30
Payer: COMMERCIAL

## 2025-01-30 DIAGNOSIS — N50.89 TESTICLE LUMP: ICD-10-CM

## 2025-01-30 PROCEDURE — 88341 IMHCHEM/IMCYTCHM EA ADD ANTB: CPT | Performed by: PATHOLOGY

## 2025-01-30 PROCEDURE — 88305 TISSUE EXAM BY PATHOLOGIST: CPT | Performed by: PATHOLOGY

## 2025-01-30 PROCEDURE — 76870 US EXAM SCROTUM: CPT

## 2025-01-30 PROCEDURE — 88173 CYTOPATH EVAL FNA REPORT: CPT | Performed by: PATHOLOGY

## 2025-01-30 PROCEDURE — 88172 CYTP DX EVAL FNA 1ST EA SITE: CPT | Performed by: PATHOLOGY

## 2025-01-30 PROCEDURE — 88312 SPECIAL STAINS GROUP 1: CPT | Performed by: PATHOLOGY

## 2025-01-30 PROCEDURE — 88112 CYTOPATH CELL ENHANCE TECH: CPT | Performed by: PATHOLOGY

## 2025-01-30 PROCEDURE — 88342 IMHCHEM/IMCYTCHM 1ST ANTB: CPT | Performed by: PATHOLOGY

## 2025-02-03 LAB — FUNGUS SPEC CULT: NORMAL

## 2025-02-04 LAB
MYCOBACTERIUM SPEC CULT: NORMAL
RHODAMINE-AURAMINE STN SPEC: NORMAL

## 2025-02-06 ENCOUNTER — RESULTS FOLLOW-UP (OUTPATIENT)
Dept: PULMONOLOGY | Facility: CLINIC | Age: 45
End: 2025-02-06

## 2025-02-10 LAB — FUNGUS SPEC CULT: NORMAL

## 2025-02-11 ENCOUNTER — OFFICE VISIT (OUTPATIENT)
Dept: HEMATOLOGY ONCOLOGY | Facility: CLINIC | Age: 45
End: 2025-02-11
Payer: COMMERCIAL

## 2025-02-11 ENCOUNTER — TELEPHONE (OUTPATIENT)
Dept: OTHER | Facility: OTHER | Age: 45
End: 2025-02-11

## 2025-02-11 VITALS
HEIGHT: 69 IN | DIASTOLIC BLOOD PRESSURE: 94 MMHG | HEART RATE: 73 BPM | SYSTOLIC BLOOD PRESSURE: 140 MMHG | WEIGHT: 179 LBS | OXYGEN SATURATION: 96 % | TEMPERATURE: 97.6 F | BODY MASS INDEX: 26.51 KG/M2

## 2025-02-11 DIAGNOSIS — D86.9 SARCOIDOSIS: Primary | ICD-10-CM

## 2025-02-11 LAB
MYCOBACTERIUM SPEC CULT: NORMAL
RHODAMINE-AURAMINE STN SPEC: NORMAL

## 2025-02-11 PROCEDURE — 99214 OFFICE O/P EST MOD 30 MIN: CPT | Performed by: INTERNAL MEDICINE

## 2025-02-11 NOTE — PROGRESS NOTES
[facility] [place]  [address] [facility] [address]  [phone]  [phone]    Jaime Carranza,1980, [id number]  02/11/25    Discussion:   In summary, this is a 44-year-old male with a history of mediastinal lymphadenopathy.  1/24/2025 biopsy of multiple mediastinal lymph nodes showed no malignancy.  Findings consistent with nonnecrotizing granulomata.  Infectious versus sarcoidosis.  Flow cytometry negative for lymphoproliferative disorder.  Patient had right sided testicular finding.  Ultrasound shows right-sided varicocele.  We reviewed the general nature of sarcoidosis.  He has occasional chest discomfort.  This is not limiting.  Denies shortness of breath, cough, hemoptysis, etc.  No other symptoms to suggest systemic involvement.  He has follow-up in pulmonology.  No evidence of malignancy or hematologic disorder.  Further follow-up in our office as needed.  I discussed the above with the patient.  The patient and his family (who acted as  throughout the course of the interview today) voiced understanding and agreement.  ______________________________________________________________________    Chief Complaint   Patient presents with    Consult       HPI:  Oncology History    No history exists.       Interval History: Clinically stable.  ECOG-  1 - Symptomatic but completely ambulatory    Review of Systems   Constitutional:  Negative for chills and fever.   HENT:  Negative for nosebleeds.    Eyes:  Negative for discharge.   Respiratory:  Negative for cough and shortness of breath.    Cardiovascular:  Positive for chest pain.   Gastrointestinal:  Negative for abdominal pain, constipation and diarrhea.   Endocrine: Negative for polydipsia.   Genitourinary:  Negative for hematuria.   Musculoskeletal:  Negative for arthralgias.   Skin:  Negative for color change.   Allergic/Immunologic: Negative for immunocompromised state.   Neurological:  Negative for dizziness  "and headaches.   Hematological:  Negative for adenopathy.   Psychiatric/Behavioral:  Negative for agitation.        Past Medical History:   Diagnosis Date    Enlarged lymph nodes     chest    Kidney stone     Lymphoma (HCC)      Patient Active Problem List   Diagnosis    Mediastinal lymphadenopathy    Family history of glucose-6-phosphate dehydrogenase (G6PD) deficiency       Current Outpatient Medications:     meloxicam (Mobic) 15 mg tablet, Take 1 tablet (15 mg total) by mouth daily for 10 days (Patient not taking: Reported on 1/21/2025), Disp: 10 tablet, Rfl: 0  No Known Allergies  Past Surgical History:   Procedure Laterality Date    LEG SURGERY Right 2014    Iron suresh in Right tibia     Social History     Objective:  Vitals:    02/11/25 0855   BP: 140/94   BP Location: Right arm   Patient Position: Sitting   Cuff Size: Standard   Pulse: 73   Temp: 97.6 °F (36.4 °C)   TempSrc: Temporal   SpO2: 96%   Weight: 81.2 kg (179 lb)   Height: 5' 9\" (1.753 m)     Physical Exam  Constitutional:       Appearance: He is well-developed.   HENT:      Head: Normocephalic and atraumatic.      Mouth/Throat:      Mouth: Mucous membranes are moist.   Eyes:      Pupils: Pupils are equal, round, and reactive to light.   Cardiovascular:      Rate and Rhythm: Normal rate and regular rhythm.      Heart sounds: No murmur heard.  Pulmonary:      Breath sounds: Normal breath sounds. No wheezing or rales.   Abdominal:      Palpations: Abdomen is soft.      Tenderness: There is no abdominal tenderness.   Musculoskeletal:         General: No tenderness. Normal range of motion.      Cervical back: Neck supple.   Lymphadenopathy:      Cervical: No cervical adenopathy.   Skin:     Findings: No erythema or rash.   Neurological:      Mental Status: He is alert and oriented to person, place, and time.      Cranial Nerves: No cranial nerve deficit.      Deep Tendon Reflexes: Reflexes are normal and symmetric.   Psychiatric:         Behavior: Behavior " normal.           Labs:  I personally reviewed the labs and imaging pertinent to this patient care.

## 2025-02-11 NOTE — TELEPHONE ENCOUNTER
Patient accidentally canceled his appointment for 2/21/25 at 1040 for Pulmonary via my chart. Was able to reschedule patient for the same date and time.

## 2025-02-17 ENCOUNTER — OFFICE VISIT (OUTPATIENT)
Dept: PULMONOLOGY | Facility: CLINIC | Age: 45
End: 2025-02-17
Payer: COMMERCIAL

## 2025-02-17 VITALS
TEMPERATURE: 98.2 F | HEIGHT: 69 IN | SYSTOLIC BLOOD PRESSURE: 126 MMHG | OXYGEN SATURATION: 98 % | BODY MASS INDEX: 26.96 KG/M2 | DIASTOLIC BLOOD PRESSURE: 84 MMHG | WEIGHT: 182 LBS | HEART RATE: 69 BPM

## 2025-02-17 DIAGNOSIS — R07.89 ATYPICAL CHEST PAIN: ICD-10-CM

## 2025-02-17 DIAGNOSIS — D86.0 PULMONARY SARCOIDOSIS (HCC): Primary | ICD-10-CM

## 2025-02-17 LAB — FUNGUS SPEC CULT: NORMAL

## 2025-02-17 PROCEDURE — 99214 OFFICE O/P EST MOD 30 MIN: CPT

## 2025-02-17 NOTE — PROGRESS NOTES
Progress note - Pulmonary Medicine   Jaime Carranza 44 y.o. male MRN: [id number]       Impression & Plan:   Jaime Carranza is a 44 y.o. male with PMHx of sarcoidosis who presents for follow-up.    Pulmonary Sarcoidosis  - Mediastinal lymphadenopathy with EBUS showing elevated CD4/CD8 and noncaseating granulomas on biopsy with negative culture data, suggestive of sarcoidosis as potential etiology.  Fortunately there does not appear to be any parenchymal involvement and he is currently stage I.  He is asymptomatic, no indication for initiation of treatment at currently.  - He did have a CMP 1 month ago with elevated Cr, plan for repeat along with lab work listed below.  - EKG during ED visit in 1/2025 showed RBBB, he does have atypical chest pain as noted below, but this appears positional.  - Recommend having yearly ophthalmology appointments.  - Will check PFTs for baseline.  - Follow-up in 3 months.  -     Echo complete w/ contrast if indicated; Future  -     Complete PFT with post Bronchodilator and Six Minute walk; Future  -     Comprehensive metabolic panel; Future  -     CBC and differential; Future  -     Angiotensin converting enzyme; Future  -     Lysozyme, serum; Future  -     Vitamin D 1,25 Dihydroxy; Future    Pulmonary Nodules  - Two 4 mm nodules seen on CT from 1/2025, repeat CT is pending.  These could be related to the above.    Atypical chest pain  - The patient notes central chest pain which is worse in the morning and does not necessarily worsen with activity.  He does also note that sometimes it is worse when he twists from side-to-side although is not reproducible on exam today.  Given concern for sarcoidosis and RBBB on EKG recently will plan for echocardiogram as above although this chest pain does appear noncardiac in nature.    Return in about 3 months (around 5/17/2025).  ______________________________________________________________________    HPI:    Jaime Carranza is a 44 y.o. male with  "PMHx of sarcoidosis who presents for follow-up.  The patient was last seen in the office on 1/22/2025 at which time he was noted to have mediastinal lymphadenopathy and two 4 mm pulmonary nodules as well as describing a testicular lump, plan was for EBUS, scrotum/groin ultrasound and CT chest in 3 months.  EBUS showed no evidence of malignancy, but did show elevated CD4/CD8 (14.33) with noncaseating granulomas on biopsy, all culture data has been negative so far.  He states he feels unchanged from prior and denies any dyspnea, cough, wheezing, hemoptysis or night sweats.  He does note some continued mild discomfort in the center of his chest that is worse first thing in the morning and improves throughout the day, he also notes that it can be worse when he twists from side-to-side and denies exertional chest pain.  He also denies palpitations, pedal edema and syncope.    Current Medications:    Current Outpatient Medications:     meloxicam (Mobic) 15 mg tablet, Take 1 tablet (15 mg total) by mouth daily for 10 days (Patient not taking: Reported on 1/21/2025), Disp: 10 tablet, Rfl: 0    Review of Systems:  Review of Systems  10-point system review completed, all of which are negative except as mentioned above.    Past medical history, surgical history, and family history were reviewed and updated as appropriate    Social history updates:  Social History     Tobacco Use   Smoking Status Never    Passive exposure: Never   Smokeless Tobacco Never     PhysicalExamination:  Vitals:   /84 (BP Location: Left arm, Patient Position: Sitting, Cuff Size: Adult)   Pulse 69   Temp 98.2 °F (36.8 °C) (Temporal)   Ht 5' 9\" (1.753 m)   Wt 82.6 kg (182 lb)   SpO2 98%   BMI 26.88 kg/m²   Body mass index is 26.88 kg/m².    Constitutional: NAD, well appearing, on room air, no conversational dyspnea   Skin: Warm, dry, no rashes noted   Eyes: PERRL, normal conjunctiva  ENT: Nasal congestion absent, moist mucus membranes.  Neck: " No JVD, trachea is midline, no adenopathy.  Resp: CTA B/L, no W/R/R   Cardiac: RRR, +S1/S2, no M/R/G  Extremities: No digital clubbing or pedal edema  Neuro: AAOx3    Diagnostic Data:  Labs:  I personally reviewed the most recent laboratory data pertinent to today's visit    Lab Results   Component Value Date    WBC 4.87 01/18/2025    HGB 13.9 01/18/2025    HCT 42.0 01/18/2025    MCV 85 01/18/2025     01/18/2025     Lab Results   Component Value Date    SODIUM 137 01/02/2025    K 3.6 01/02/2025    CO2 31 01/02/2025     01/02/2025    BUN 17 01/02/2025    CREATININE 1.31 (H) 01/02/2025    CALCIUM 9.8 01/02/2025     PFT results:  None    Imaging:  I personally reviewed the images in PACS pertinent to today's visit:  CTA Dissection Protocol C/[facility]/P [facility] -- 1/2/2025  1) No thoracic or abdominal aortic aneurysm, intramural hematoma or dissection.  2) Lymphadenopathy in the mediastinal, bilateral hilar, lower paraesophageal and gastrohepatic ligament chains and borderline enlarged left supraclavicular lymph node, as detailed above. This is concerning for lymphoma, or less likely metastatic disease. No definite primary malignancy identified within limitation of CT technique.   3) Two nonspecific pulmonary nodules, the larger measuring 4 mm. Recommend reevaluation with chest CT in 3 months as metastatic disease cannot be entirely excluded.  4) Two hyperdense peripheral foci in the left hepatic lobe at the dome, measuring up to 4 mm, statistically likely flash filling hemangiomas or arterioportal shunts. Recommend attention on follow-up for confirmation with abdominal MRI (without and with IV contrast).  5) Additional findings as above.    Other studies:  EKG -- 1/2/2025  Normal sinus rhythm  Incomplete right bundle branch block  Borderline ECG  No previous ECGs available       Abi Estevez MD  Pulmonary-Critical Care and Sleep Medicine  02/17/25    Portions of the record may have been created with voice  "recognition software. Occasional wrong word or \"sound a like\" substitutions may have occurred due to the inherent limitations of voice recognition software. Please read the chart carefully and recognize, using context, where substitutions have occurred.  "

## 2025-02-17 NOTE — PATIENT INSTRUCTIONS
- Please have your blood work completed  - Lung function testing  - Echocardiogram (ultrasound of the heart)  - Follow up in 3 months

## 2025-02-18 LAB
MYCOBACTERIUM SPEC CULT: NORMAL
RHODAMINE-AURAMINE STN SPEC: NORMAL

## 2025-02-24 LAB — FUNGUS SPEC CULT: NORMAL

## 2025-02-25 LAB
MYCOBACTERIUM SPEC CULT: NORMAL
RHODAMINE-AURAMINE STN SPEC: NORMAL

## 2025-02-27 ENCOUNTER — HOSPITAL ENCOUNTER (OUTPATIENT)
Dept: PULMONOLOGY | Facility: HOSPITAL | Age: 45
End: 2025-02-27
Payer: COMMERCIAL

## 2025-02-27 DIAGNOSIS — D86.0 PULMONARY SARCOIDOSIS (HCC): ICD-10-CM

## 2025-02-27 PROCEDURE — 94618 PULMONARY STRESS TESTING: CPT | Performed by: INTERNAL MEDICINE

## 2025-02-27 PROCEDURE — 94618 PULMONARY STRESS TESTING: CPT

## 2025-02-27 PROCEDURE — 94060 EVALUATION OF WHEEZING: CPT | Performed by: INTERNAL MEDICINE

## 2025-02-27 PROCEDURE — 94726 PLETHYSMOGRAPHY LUNG VOLUMES: CPT

## 2025-02-27 PROCEDURE — 94060 EVALUATION OF WHEEZING: CPT

## 2025-02-27 PROCEDURE — 94729 DIFFUSING CAPACITY: CPT

## 2025-02-27 PROCEDURE — 94726 PLETHYSMOGRAPHY LUNG VOLUMES: CPT | Performed by: INTERNAL MEDICINE

## 2025-02-27 PROCEDURE — 94729 DIFFUSING CAPACITY: CPT | Performed by: INTERNAL MEDICINE

## 2025-02-27 RX ORDER — ALBUTEROL SULFATE 0.83 MG/ML
2.5 SOLUTION RESPIRATORY (INHALATION) ONCE AS NEEDED
Status: COMPLETED | OUTPATIENT
Start: 2025-02-27 | End: 2025-02-27

## 2025-02-27 RX ADMIN — ALBUTEROL SULFATE 2.5 MG: 2.5 SOLUTION RESPIRATORY (INHALATION) at 16:05

## 2025-02-28 ENCOUNTER — RESULTS FOLLOW-UP (OUTPATIENT)
Dept: SLEEP CENTER | Facility: CLINIC | Age: 45
End: 2025-02-28

## 2025-02-28 ENCOUNTER — HOSPITAL ENCOUNTER (OUTPATIENT)
Dept: NON INVASIVE DIAGNOSTICS | Facility: HOSPITAL | Age: 45
Discharge: HOME | End: 2025-02-28
Payer: COMMERCIAL

## 2025-02-28 VITALS
BODY MASS INDEX: 26.96 KG/M2 | SYSTOLIC BLOOD PRESSURE: 126 MMHG | DIASTOLIC BLOOD PRESSURE: 84 MMHG | HEART RATE: 80 BPM | HEIGHT: 69 IN | WEIGHT: 182 LBS

## 2025-02-28 DIAGNOSIS — D86.0 PULMONARY SARCOIDOSIS (HCC): ICD-10-CM

## 2025-02-28 LAB
AORTIC ROOT: 3 CM
ASCENDING AORTA: 2.9 CM
BSA FOR ECHO PROCEDURE: 1.98 M2
E WAVE DECELERATION TIME: 242 MS
E/A RATIO: 1.5
FRACTIONAL SHORTENING: 35 (ref 28–44)
INTERVENTRICULAR SEPTUM IN DIASTOLE (PARASTERNAL SHORT AXIS VIEW): 1.3 CM
INTERVENTRICULAR SEPTUM: 1.3 CM (ref 0.6–1.1)
LAAS-AP2: 10.2 CM2
LAAS-AP4: 10.2 CM2
LEFT ATRIUM SIZE: 3.4 CM
LEFT ATRIUM VOLUME (MOD BIPLANE): 22 ML
LEFT ATRIUM VOLUME INDEX (MOD BIPLANE): 11.1 ML/M2
LEFT INTERNAL DIMENSION IN SYSTOLE: 3.1 CM (ref 2.1–4)
LEFT VENTRICULAR INTERNAL DIMENSION IN DIASTOLE: 4.8 CM (ref 3.5–6)
LEFT VENTRICULAR POSTERIOR WALL IN END DIASTOLE: 1.4 CM
LEFT VENTRICULAR STROKE VOLUME: 73 ML
LV EF US.2D.A4C+ESTIMATED: 65 %
LVSV (TEICH): 73 ML
MV E'TISSUE VEL-LAT: 18 CM/S
MV E'TISSUE VEL-SEP: 15 CM/S
MV PEAK A VEL: 0.62 M/S
MV PEAK E VEL: 93 CM/S
MV STENOSIS PRESSURE HALF TIME: 70 MS
MV VALVE AREA P 1/2 METHOD: 3.14
RA PRESSURE ESTIMATED: 5 MMHG
RIGHT ATRIUM AREA SYSTOLE A4C: 12.8 CM2
RIGHT VENTRICLE ID DIMENSION: 2.7 CM
RV PSP: 34 MMHG
SL CV LEFT ATRIUM LENGTH A2C: 3.8 CM
SL CV LV EF: 65
SL CV PED ECHO LEFT VENTRICLE DIASTOLIC VOLUME (MOD BIPLANE) 2D: 109 ML
SL CV PED ECHO LEFT VENTRICLE SYSTOLIC VOLUME (MOD BIPLANE) 2D: 37 ML
TR MAX PG: 29 MMHG
TR PEAK VELOCITY: 2.7 M/S
TRICUSPID ANNULAR PLANE SYSTOLIC EXCURSION: 2 CM
TRICUSPID VALVE PEAK REGURGITATION VELOCITY: 2.7 M/S

## 2025-02-28 PROCEDURE — 93306 TTE W/DOPPLER COMPLETE: CPT | Performed by: INTERNAL MEDICINE

## 2025-02-28 PROCEDURE — 93306 TTE W/DOPPLER COMPLETE: CPT

## 2025-03-04 DIAGNOSIS — I51.7 MILD CONCENTRIC LEFT VENTRICULAR HYPERTROPHY: ICD-10-CM

## 2025-03-04 DIAGNOSIS — I45.10 INCOMPLETE RBBB: ICD-10-CM

## 2025-03-04 DIAGNOSIS — D86.9 SARCOIDOSIS: Primary | ICD-10-CM

## 2025-03-04 LAB
MYCOBACTERIUM SPEC CULT: NORMAL
RHODAMINE-AURAMINE STN SPEC: NORMAL

## 2025-03-11 LAB
MYCOBACTERIUM SPEC CULT: NORMAL
RHODAMINE-AURAMINE STN SPEC: NORMAL

## 2025-04-22 ENCOUNTER — HOSPITAL ENCOUNTER (OUTPATIENT)
Dept: CT IMAGING | Facility: HOSPITAL | Age: 45
Discharge: HOME | End: 2025-04-22
Payer: COMMERCIAL

## 2025-04-22 DIAGNOSIS — R91.8 PULMONARY NODULES: ICD-10-CM

## 2025-04-22 PROCEDURE — 71250 CT THORAX DX C-: CPT

## 2025-04-29 ENCOUNTER — OFFICE VISIT (OUTPATIENT)
Dept: FAMILY MEDICINE CLINIC | Facility: CLINIC | Age: 45
End: 2025-04-29
Payer: COMMERCIAL

## 2025-04-29 ENCOUNTER — RESULTS FOLLOW-UP (OUTPATIENT)
Dept: PULMONOLOGY | Facility: CLINIC | Age: 45
End: 2025-04-29

## 2025-04-29 VITALS
OXYGEN SATURATION: 98 % | BODY MASS INDEX: 26.85 KG/M2 | SYSTOLIC BLOOD PRESSURE: 116 MMHG | WEIGHT: 181.8 LBS | TEMPERATURE: 96.1 F | HEART RATE: 80 BPM | DIASTOLIC BLOOD PRESSURE: 76 MMHG

## 2025-04-29 DIAGNOSIS — L85.3 DRY SKIN: ICD-10-CM

## 2025-04-29 DIAGNOSIS — D86.0 PULMONARY SARCOIDOSIS (HCC): Primary | ICD-10-CM

## 2025-04-29 PROCEDURE — 99213 OFFICE O/P EST LOW 20 MIN: CPT | Performed by: FAMILY MEDICINE

## 2025-04-29 RX ORDER — AMMONIUM LACTATE 12 G/100G
CREAM TOPICAL AS NEEDED
Qty: 140 G | Refills: 0 | Status: SHIPPED | OUTPATIENT
Start: 2025-04-29

## 2025-04-29 NOTE — ASSESSMENT & PLAN NOTE
Diagnosed via EBUS biopsy showing nonnecrotizing granulomas  Reviewed prior echo, PFT, EBUS with biopsy findings with patient  Currently stage I and continues to follow-up with pulmonology and now cardiology with pending labwork ordered by pulm

## 2025-04-29 NOTE — PROGRESS NOTES
Name: Jaime Carranza      : 1980      MRN: [id number]  Encounter Provider: Isaías Cosme MD  Encounter Date: 2025   Encounter department: [facility] PRIMARY CARE  :  Assessment & Plan  Pulmonary sarcoidosis (HCC)  Diagnosed via EBUS biopsy showing nonnecrotizing granulomas  Reviewed prior echo, PFT, EBUS with biopsy findings with patient  Currently stage I and continues to follow-up with pulmonology and now cardiology with pending labwork ordered by pulm         Dry skin  Noted on plantar aspect of left foot  If unrelieved with lac hydrin, will treat as potential tenia pedis   Orders:    ammonium lactate (LAC-HYDRIN) 12 % cream; Apply topically as needed for dry skin           History of Present Illness   Patient presented to the clinic for a follow-up on mediastinal lymphadenopathy.  Patient had seen multiple specialists since the last time we saw him in 2025.  Since then, patient has seen hematology/oncology, pulmonology, and has an upcoming cardiology appointment tomorrow.  Patient had testing including echocardiogram, PFT, EBUS and biopsy showing noncancerous cells but does show granulomas and pulmonologist has diagnosed patient with pulmonary sarcoidosis stage I.  At this time, other lab work have been ordered by pulmonology and he has a follow-up with them in May 2025.  Patient denies any current shortness of breath but does have occasional chest pain (not currently) for which she was evaluated before and the need for cardiology referral was established which patient sees tomorrow.  Echocardiogram appears within normal limits.  Patient denies any fevers or weight loss but does have occasional chills.    Of note, patient is also complaining of some dry skin on his left plantar aspect of his foot.  Denies any ulcerations, discharge from area.      Review of Systems   Constitutional:  Positive for chills.   Respiratory:  Negative for chest tightness, shortness of breath and  wheezing.    Cardiovascular:  Positive for chest pain (Intermittent). Negative for palpitations.   Gastrointestinal:  Negative for abdominal pain, nausea and vomiting.   Musculoskeletal:  Negative for arthralgias and myalgias.       Objective   /76   Pulse 80   Temp (!) 96.1 °F (35.6 °C)   Wt 82.5 kg (181 lb 12.8 oz)   SpO2 98%   BMI 26.85 kg/m²      Physical Exam  Vitals reviewed.   Constitutional:       General: He is not in acute distress.     Appearance: He is well-developed.   HENT:      Head: Normocephalic.      Nose: Nose normal. No rhinorrhea.   Eyes:      General: No scleral icterus.     Extraocular Movements: Extraocular movements intact.      Conjunctiva/sclera: Conjunctivae normal.   Cardiovascular:      Rate and Rhythm: Normal rate and regular rhythm.      Pulses: Normal pulses.      Heart sounds: Normal heart sounds. No murmur heard.  Pulmonary:      Effort: Pulmonary effort is normal. No respiratory distress.      Breath sounds: Normal breath sounds. No wheezing.   Abdominal:      General: Bowel sounds are normal.      Palpations: Abdomen is soft.      Tenderness: There is no abdominal tenderness.   Musculoskeletal:         General: No swelling.   Skin:     General: Skin is dry.      Capillary Refill: Capillary refill takes less than 2 seconds.      Comments: Left plantar aspect of the foot with dry skin in need of a moisturizing agent   Neurological:      Mental Status: He is alert.      Gait: Gait normal.   Psychiatric:         Mood and Affect: Mood normal.

## 2025-04-30 ENCOUNTER — CONSULT (OUTPATIENT)
Dept: CARDIOLOGY CLINIC | Facility: CLINIC | Age: 45
End: 2025-04-30
Payer: COMMERCIAL

## 2025-04-30 VITALS
HEIGHT: 68 IN | WEIGHT: 183 LBS | DIASTOLIC BLOOD PRESSURE: 76 MMHG | HEART RATE: 68 BPM | RESPIRATION RATE: 16 BRPM | OXYGEN SATURATION: 99 % | BODY MASS INDEX: 27.74 KG/M2 | SYSTOLIC BLOOD PRESSURE: 124 MMHG

## 2025-04-30 DIAGNOSIS — I51.7 MILD CONCENTRIC LEFT VENTRICULAR HYPERTROPHY: ICD-10-CM

## 2025-04-30 DIAGNOSIS — D86.9 SARCOIDOSIS: ICD-10-CM

## 2025-04-30 DIAGNOSIS — I45.10 INCOMPLETE RBBB: ICD-10-CM

## 2025-04-30 PROCEDURE — 99244 OFF/OP CNSLTJ NEW/EST MOD 40: CPT | Performed by: INTERNAL MEDICINE

## 2025-04-30 NOTE — ASSESSMENT & PLAN NOTE
"Mild to moderate  With Inc RBBB in patient with no h/o HTN  And known Pulmonary Sarcoidosis (biopsy proven)    Cardiac MRI ordered to assess for cardiac involvement - if \"+\" will refer to our cardiomyopathy clinic    Discussed lack of specific treatment for, if present, cardiac sarcoidosis other than corticosteroids/immunosuppressants.  Reviewed many potential cardiac symptoms, and signs, of cardiac sarcoidosis (heart failure, arrhythmias, pulm htn) and explained how these can be treated as needed-without any of these issues currently I advise no specific therapies at this time    "

## 2025-04-30 NOTE — PROGRESS NOTES
" Patient ID: Jaime Carranza is a 45 y.o. male.        Plan:      Mild concentric left ventricular hypertrophy  Mild to moderate  With Inc RBBB in patient with no h/o HTN  And known Pulmonary Sarcoidosis (biopsy proven)    Cardiac MRI ordered to assess for cardiac involvement - if \"+\" will refer to our cardiomyopathy clinic    Discussed lack of specific treatment for, if present, cardiac sarcoidosis other than corticosteroids/immunosuppressants.  Reviewed many potential cardiac symptoms, and signs, of cardiac sarcoidosis (heart failure, arrhythmias, pulm htn) and explained how these can be treated as needed-without any of these issues currently I advise no specific therapies at this time       Follow up Plan/Other summary comments:  Return in about 1 year (around 4/30/2026).  Call sooner with issues/cardiac symptoms.     HPI: Jaime is a 44 yo  male here for cardiac evaluation in light of a recent diagnosis of Pulmonary Sarcoidosis diagnosed via EBUS biopsy , and abnormal EKG and abnormal ECHO.  Jordan benavides MA expertly provided interpretation services for me today.  He really isnt having any cardiac symptoms.  Had some vague palpitations one day but none before or since.  No (pre)syncope.  No alarming SOB or EARL, no edema nor orthopnea.  No TIA or claudication sx.  Was in ER for constant chest pains (weeks) back in January-records reviewed.  CE and EKG negative for ACS.  No clear exertional chest pains nor tightness, some fleeting chest pains but again nonexertional.  Reportedly worse with certain upper body twisting movement.    Normal sinus rhythm  Incomplete right bundle branch block  Borderline ECG  No previous ECGs available  Confirmed by Nathan Dc ([id number]) on 1/3/2025         Most recent or relevant cardiac/vascular testing:      Left Ventricle: Left ventricular cavity size is normal. Wall thickness is normal. There is mild concentric hypertrophy. The left ventricular ejection fraction is 65%. Systolic " Rx Refill Note  Requested Prescriptions     Pending Prescriptions Disp Refills    levothyroxine (SYNTHROID, LEVOTHROID) 75 MCG tablet 30 tablet 2     Sig: Take 1 tablet by mouth Daily.      Last office visit with prescribing clinician: 9/15/2023   Last telemedicine visit with prescribing clinician: Visit date not found   Next office visit with prescribing clinician: 12/13/2023                         Would you like a call back once the refill request has been completed: [] Yes [] No    If the office needs to give you a call back, can they leave a voicemail: [] Yes [] No    Pallavi Alex MA  12/13/23, 10:56 CST     "function is normal. Wall motion is normal. Diastolic function is normal.    Right Ventricle: Right ventricular cavity size is normal. Systolic function is normal.  NOTE-LV wall thickness 1.3 cm    CTA Dissection Protocol [facility]P [facility] -- 1/2/2025  1) No thoracic or abdominal aortic aneurysm, intramural hematoma or dissection.  2) Lymphadenopathy in the mediastinal, bilateral hilar, lower paraesophageal and gastrohepatic ligament chains and borderline enlarged left supraclavicular lymph node, as detailed above. This is concerning for lymphoma, or less likely metastatic disease. No definite primary malignancy identified within limitation of CT technique.   3) Two nonspecific pulmonary nodules, the larger measuring 4 mm. Recommend reevaluation with chest CT in 3 months as metastatic disease cannot be entirely excluded.  4) Two hyperdense peripheral foci in the left hepatic lobe at the dome, measuring up to 4 mm, statistically likely flash filling hemangiomas or arterioportal shunts.    Past Surgical History:   Procedure Laterality Date    LEG SURGERY Right 2014    Iron suresh in Right tibia       Lipid Profile: Reviewed   (nondiabetic, no known PAD nor CAD)    A1C 5.9    Cr 1.3    Review of Systems   10  point ROS  was otherwise non pertinent or negative except as per HPI or as below.        Objective:     /76 (BP Location: Left arm, Patient Position: Sitting, Cuff Size: Standard)   Pulse 68   Resp 16   Ht 5' 8.11\" (1.73 m)   Wt 83 kg (183 lb)   SpO2 99%   BMI 27.74 kg/m²     PHYSICAL EXAM:    General:  Normal appearance in no distress.  Eyes:  Anicteric.  Oral mucosa:  Moist.  Neck:  No JVD. Carotid upstrokes are brisk without bruits.  No masses.  Chest:  Clear to auscultation.  Cardiac:  No palpable PMI.  Normal S1 and S2.  No murmur gallop or rub.  Abdomen:  Soft and nontender. No palpable organomegaly or aortic enlargement.  Extremities:  No peripheral edema.  Musculoskeletal:  Symmetric.   Vascular:  " Pedal pulses are intact.  Neuro:  Grossly symmetric.  Psych:  Alert and oriented x3.      Meds reviewed.    Current Outpatient Medications:     ammonium lactate (LAC-HYDRIN) 12 % cream, Apply topically as needed for dry skin, Disp: 140 g, Rfl: 0     Past Medical History:   Diagnosis Date    Enlarged lymph nodes     chest    Kidney stone     Lymphoma (HCC)            Social History     Tobacco Use   Smoking Status Never    Passive exposure: Never   Smokeless Tobacco Never

## 2025-05-24 ENCOUNTER — LAB (OUTPATIENT)
Dept: LAB | Facility: CLINIC | Age: 45
End: 2025-05-24
Payer: COMMERCIAL

## 2025-05-24 DIAGNOSIS — D86.0 PULMONARY SARCOIDOSIS (HCC): ICD-10-CM

## 2025-05-24 PROCEDURE — 85025 COMPLETE CBC W/AUTO DIFF WBC: CPT

## 2025-05-24 PROCEDURE — 82164 ANGIOTENSIN I ENZYME TEST: CPT

## 2025-05-24 PROCEDURE — 85549 MURAMIDASE: CPT

## 2025-05-24 PROCEDURE — 80053 COMPREHEN METABOLIC PANEL: CPT

## 2025-05-24 PROCEDURE — 36415 COLL VENOUS BLD VENIPUNCTURE: CPT

## 2025-05-24 PROCEDURE — 82652 VIT D 1 25-DIHYDROXY: CPT

## 2025-05-25 LAB
ALBUMIN SERPL BCG-MCNC: 4.6 G/DL (ref 3.5–5)
ALP SERPL-CCNC: 89 U/L (ref 34–104)
ALT SERPL W P-5'-P-CCNC: 28 U/L (ref 7–52)
ANION GAP SERPL CALCULATED.3IONS-SCNC: 9 MMOL/L (ref 4–13)
AST SERPL W P-5'-P-CCNC: 26 U/L (ref 13–39)
BASOPHILS # BLD AUTO: 0.02 THOUSANDS/ÂΜL (ref 0–0.1)
BASOPHILS NFR BLD AUTO: 0 % (ref 0–1)
BILIRUB SERPL-MCNC: 0.46 MG/DL (ref 0.2–1)
BUN SERPL-MCNC: 22 MG/DL (ref 5–25)
CALCIUM SERPL-MCNC: 10 MG/DL (ref 8.4–10.2)
CHLORIDE SERPL-SCNC: 99 MMOL/L (ref 96–108)
CO2 SERPL-SCNC: 30 MMOL/L (ref 21–32)
CREAT SERPL-MCNC: 0.96 MG/DL (ref 0.6–1.3)
EOSINOPHIL # BLD AUTO: 0.27 THOUSAND/ÂΜL (ref 0–0.61)
EOSINOPHIL NFR BLD AUTO: 5 % (ref 0–6)
ERYTHROCYTE [DISTWIDTH] IN BLOOD BY AUTOMATED COUNT: 14 % (ref 11.6–15.1)
GFR SERPL CREATININE-BSD FRML MDRD: 96 ML/MIN/1.73SQ M
GLUCOSE P FAST SERPL-MCNC: 103 MG/DL (ref 65–99)
HCT VFR BLD AUTO: 43.6 % (ref 36.5–49.3)
HGB BLD-MCNC: 14.3 G/DL (ref 12–17)
IMM GRANULOCYTES # BLD AUTO: 0.01 THOUSAND/UL (ref 0–0.2)
IMM GRANULOCYTES NFR BLD AUTO: 0 % (ref 0–2)
LYMPHOCYTES # BLD AUTO: 1.85 THOUSANDS/ÂΜL (ref 0.6–4.47)
LYMPHOCYTES NFR BLD AUTO: 35 % (ref 14–44)
MCH RBC QN AUTO: 28.1 PG (ref 26.8–34.3)
MCHC RBC AUTO-ENTMCNC: 32.8 G/DL (ref 31.4–37.4)
MCV RBC AUTO: 86 FL (ref 82–98)
MONOCYTES # BLD AUTO: 0.68 THOUSAND/ÂΜL (ref 0.17–1.22)
MONOCYTES NFR BLD AUTO: 13 % (ref 4–12)
NEUTROPHILS # BLD AUTO: 2.49 THOUSANDS/ÂΜL (ref 1.85–7.62)
NEUTS SEG NFR BLD AUTO: 47 % (ref 43–75)
NRBC BLD AUTO-RTO: 0 /100 WBCS
PLATELET # BLD AUTO: 243 THOUSANDS/UL (ref 149–390)
PMV BLD AUTO: 12 FL (ref 8.9–12.7)
POTASSIUM SERPL-SCNC: 4.1 MMOL/L (ref 3.5–5.3)
PROT SERPL-MCNC: 8.2 G/DL (ref 6.4–8.4)
RBC # BLD AUTO: 5.09 MILLION/UL (ref 3.88–5.62)
SODIUM SERPL-SCNC: 138 MMOL/L (ref 135–147)
WBC # BLD AUTO: 5.32 THOUSAND/UL (ref 4.31–10.16)

## 2025-05-26 LAB — 1,25(OH)2D SERPL-MCNC: 49.2 PG/ML (ref 5–200)

## 2025-05-28 ENCOUNTER — HOSPITAL ENCOUNTER (OUTPATIENT)
Dept: MRI IMAGING | Facility: HOSPITAL | Age: 45
Discharge: HOME | End: 2025-05-28
Attending: INTERNAL MEDICINE
Payer: COMMERCIAL

## 2025-05-28 ENCOUNTER — RESULTS FOLLOW-UP (OUTPATIENT)
Dept: CARDIOLOGY CLINIC | Facility: CLINIC | Age: 45
End: 2025-05-28

## 2025-05-28 DIAGNOSIS — I51.7 MILD CONCENTRIC LEFT VENTRICULAR HYPERTROPHY: ICD-10-CM

## 2025-05-28 DIAGNOSIS — I45.10 INCOMPLETE RBBB: ICD-10-CM

## 2025-05-28 DIAGNOSIS — D86.9 SARCOIDOSIS: ICD-10-CM

## 2025-05-28 LAB
ACE SERPL-CCNC: 82 U/L (ref 14–82)
LYSOZYME SERPL-MCNC: 6.4 UG/ML (ref 3.4–7.6)

## 2025-05-28 PROCEDURE — 75561 CARDIAC MRI FOR MORPH W/DYE: CPT

## 2025-05-28 PROCEDURE — A9585 GADOBUTROL INJECTION: HCPCS | Performed by: INTERNAL MEDICINE

## 2025-05-28 RX ORDER — GADOBUTROL 604.72 MG/ML
16 INJECTION INTRAVENOUS
Status: COMPLETED | OUTPATIENT
Start: 2025-05-28 | End: 2025-05-28

## 2025-05-28 RX ADMIN — GADOBUTROL 16 ML: 604.72 INJECTION INTRAVENOUS at 12:03

## 2025-05-28 NOTE — RESULT ENCOUNTER NOTE
Please call the patient regarding his NORMAL CARDIAC MRI result.  No evidence for cardiac sarcoid.  See him in a year.  No new recs.

## 2025-05-29 NOTE — TELEPHONE ENCOUNTER
----- Message from Prema MADSEN sent at 5/29/2025  8:48 AM EDT -----    ----- Message -----  From: Marques Gaytan DO  Sent: 5/28/2025   4:19 PM EDT  To:  [facility]    Please call the patient regarding his NORMAL CARDIAC MRI result.  No evidence for cardiac sarcoid.  See him in a year.  No new recs.  ----- Message -----  From: Josi, Radiology Results In  Sent: 5/28/2025   2:16 PM EDT  To: Marques Gaytan DO

## 2025-06-11 ENCOUNTER — OFFICE VISIT (OUTPATIENT)
Dept: PULMONOLOGY | Facility: CLINIC | Age: 45
End: 2025-06-11
Payer: COMMERCIAL

## 2025-06-11 VITALS
DIASTOLIC BLOOD PRESSURE: 82 MMHG | HEART RATE: 86 BPM | WEIGHT: 187.6 LBS | OXYGEN SATURATION: 96 % | BODY MASS INDEX: 28.43 KG/M2 | HEIGHT: 68 IN | TEMPERATURE: 98.5 F | SYSTOLIC BLOOD PRESSURE: 124 MMHG

## 2025-06-11 DIAGNOSIS — R59.0 MEDIASTINAL LYMPHADENOPATHY: ICD-10-CM

## 2025-06-11 DIAGNOSIS — R07.89 ATYPICAL CHEST PAIN: ICD-10-CM

## 2025-06-11 DIAGNOSIS — D86.0 PULMONARY SARCOIDOSIS (HCC): Primary | ICD-10-CM

## 2025-06-11 PROCEDURE — 99214 OFFICE O/P EST MOD 30 MIN: CPT | Performed by: PHYSICIAN ASSISTANT

## 2025-06-11 NOTE — PROGRESS NOTES
Follow-up  Visit - Pulmonary Medicine   Name: Jaime Carranza      : 1980      MRN: [id number]  Encounter Provider: John Vogel PA-C  Encounter Date: 2025   Encounter department: [facility] PULMONARY [facility]  :  Assessment & Plan  Pulmonary sarcoidosis (HCC)  Mediastinal lymphadenopathy  Mediastinal lymphadenopathy present on imaging.  He underwent EBUS that showed elevated CD/CD8 and noncaseating granulomas on biopsy with negative culture data suggestive of sarcoidosis.  Repeat CT chest done 2025 was reviewed with him in the office today using an .  He has persistent but improved mediastinal and hilar adenopathy lung with stable pulmonary nodules measuring up to 6 mm.  I reviewed his pulmonary function test with him as well and they were normal.  No indication for bronchodilators  Would hold off on systemic steroids at this time as well.  Encouraged yearly evaluation with ophthalmology       Atypical chest pain  Reviewed echocardiogram with patient using an  in the office today.  No obvious abnormalities on it.  Chest pain described does not sound to be cardiac in origin.  It may be secondary to his sarcoidosis but given improvement without intervention we are both inclined to continue monitoring.  Recommended he call the office back if he notes worsening symptoms.         Follow-up with Dr. Estevez in 6 months    History of Present Illness   Jaime Carranza is a 45 y.o. male who presents to the office today for 3-month follow-up.  Patient saw Dr. Estevez in April for presumed sarcoidosis and atypical chest pain.  He had PFTs, CT chest and echocardiogram.  He is here to review those results.  An  was used to help with translation.  Patient states that he still has chest pain at times that is described as sharp however the intensity is now only 2 out of 10.  He denies precipitating factor.  Usually time/rest resolves pain on its own.  He denies shortness  "of breath, cough, sputum production, hemoptysis or wheeze.  Had recent cardiac MRI which was normal.    Review of Systems   Constitutional:  Negative for appetite change and fever.   HENT:  Negative for postnasal drip, rhinorrhea and trouble swallowing.    Cardiovascular:  Positive for chest pain.   Musculoskeletal:  Negative for myalgias.   All other systems reviewed and are negative.      Answers submitted by the patient for this visit:  Pulmonology Questionnaire (Submitted on 6/8/2025)  Chief Complaint: Primary symptoms  Do you have shortness of breath that occurs with effort or exertion?: No  Do you have ear congestion?: No  Do you have heartburn?: No  Do you have fatigue?: No  Do you have nasal congestion?: No  Do you have shortness of breath when lying flat?: No  Do you have shortness of breath when you wake up?: No  Do you have sweats?: No  Have you experienced weight loss?: No  Which of the following makes your symptoms worse?: nothing  Which of the following makes your symptoms better?: nothing    Aside from what is mentioned in the HPI, ROS is otherwise negative    Past Medical History   Past Medical History[1]  Past Surgical History[2]  Family History[3]   reports that he has never smoked. He has never been exposed to tobacco smoke. He has never used smokeless tobacco. He reports current alcohol use. He reports that he does not use drugs.  Current Outpatient Medications   Medication Instructions    ammonium lactate (LAC-HYDRIN) 12 % cream Topical, As needed   Allergies[4]      Medical History Reviewed by provider this encounter:     .    Objective   /82 (BP Location: Left arm, Patient Position: Sitting, Cuff Size: Adult)   Pulse 86   Temp 98.5 °F (36.9 °C) (Temporal)   Ht 5' 8.11\" (1.73 m)   Wt 85.1 kg (187 lb 9.6 oz)   SpO2 96%   BMI 28.43 kg/m²     Physical Exam  Vitals reviewed.   Constitutional:       Appearance: Normal appearance. He is well-developed.   HENT:      Head: Normocephalic and " atraumatic.      Nose: Nose normal.      Mouth/Throat:      Mouth: Mucous membranes are moist.     Eyes:      Extraocular Movements: Extraocular movements intact.       Cardiovascular:      Rate and Rhythm: Normal rate and regular rhythm.      Heart sounds: Normal heart sounds.   Pulmonary:      Effort: Pulmonary effort is normal. No respiratory distress.      Breath sounds: No wheezing, rhonchi or rales.     Musculoskeletal:         General: No swelling.     Skin:     General: Skin is warm and dry.     Neurological:      Mental Status: He is alert. Mental status is at baseline.     Psychiatric:         Mood and Affect: Mood normal.         Behavior: Behavior normal.       Diagnostic Data:  Labs: I personally reviewed the most recent laboratory data pertinent to today's visit.    Radiology results:  Radiology Results Review: I personally reviewed the following image studies in PACS and associated radiology reports: CT chest. My interpretation of the radiology images/reports is: as below.    CT chest without contrast 4/22/25  IMPRESSION:     1.  Persistent but improved mediastinal and hilar adenopathy. Correlate with recent biopsy results. Sarcoidosis may have a waxing and waning pattern. No pulmonary opacification or reticulonodular densities.  2.  Stable pulmonary nodules measuring up to 6 mm. Based on current Fleischner Society 2017 Guidelines on incidental pulmonary nodule, follow-up non-contrast CT is recommended at 6-12 months from the initial examination (1/2/2025) and, if stable at that   time, an additional follow-up is recommended for 18-24 months from the initial examination.    PFT/spirometry results:  PFT 2/27/2025  FEV1/FVC ratio 89%  FEV1 92% predicted  FVC 84% predicted  No change with bronchodilator  TLC 90% predicted  % predicted  DLCO corrected versus hemoglobin 100% predicted  Interpretation: Normal spirometry without bronchodilator response, normal lung volumes, normal corrected diffusion  capacity, normal lung volume.    6-minute walk: Patient walked 396 m in 6 minutes without stopping.  Resting SpO2 98%, ending SPO2 97%.  No supplemental oxygen required.    Other studies:  Echocardiogram 2/28/2025  EF 65% with normal systolic function and diastolic function.  Right ventricle is normal.  No significant valvular dysfunction.  RVSP 34 mmHg.      John Vogel PA-C           [1]   Past Medical History:  Diagnosis Date    Enlarged lymph nodes     chest    Kidney stone     Lymphoma (HCC)    [2]   Past Surgical History:  Procedure Laterality Date    LEG SURGERY Right 2014    Iron suresh in Right tibia   [3]   Family History  Problem Relation Name Age of Onset    No Known Problems Mother      No Known Problems Father     [4] No Known Allergies

## 2025-06-12 PROBLEM — R07.89 ATYPICAL CHEST PAIN: Status: ACTIVE | Noted: 2025-06-12

## 2025-06-12 NOTE — ASSESSMENT & PLAN NOTE
Mediastinal lymphadenopathy present on imaging.  He underwent EBUS that showed elevated CD/CD8 and noncaseating granulomas on biopsy with negative culture data suggestive of sarcoidosis.  Repeat CT chest done 4/22/2025 was reviewed with him in the office today using an .  He has persistent but improved mediastinal and hilar adenopathy lung with stable pulmonary nodules measuring up to 6 mm.  I reviewed his pulmonary function test with him as well and they were normal.  No indication for bronchodilators  Would hold off on systemic steroids at this time as well.  Encouraged yearly evaluation with ophthalmology

## 2025-06-12 NOTE — ASSESSMENT & PLAN NOTE
Reviewed echocardiogram with patient using an  in the office today.  No obvious abnormalities on it.  Chest pain described does not sound to be cardiac in origin.  It may be secondary to his sarcoidosis but given improvement without intervention we are both inclined to continue monitoring.  Recommended he call the office back if he notes worsening symptoms.

## 2025-07-24 ENCOUNTER — OFFICE VISIT (OUTPATIENT)
Dept: FAMILY MEDICINE CLINIC | Facility: CLINIC | Age: 45
End: 2025-07-24
Payer: COMMERCIAL

## 2025-07-24 VITALS
DIASTOLIC BLOOD PRESSURE: 70 MMHG | HEIGHT: 68 IN | TEMPERATURE: 96.7 F | BODY MASS INDEX: 29.13 KG/M2 | HEART RATE: 79 BPM | SYSTOLIC BLOOD PRESSURE: 114 MMHG | WEIGHT: 192.2 LBS | OXYGEN SATURATION: 96 %

## 2025-07-24 DIAGNOSIS — Z00.00 ANNUAL PHYSICAL EXAM: Primary | ICD-10-CM

## 2025-07-24 DIAGNOSIS — L30.9 FACIAL DERMATITIS: ICD-10-CM

## 2025-07-24 DIAGNOSIS — R73.03 PREDIABETES: ICD-10-CM

## 2025-07-24 DIAGNOSIS — E78.00 ELEVATED LDL CHOLESTEROL LEVEL: ICD-10-CM

## 2025-07-24 PROCEDURE — 99396 PREV VISIT EST AGE 40-64: CPT | Performed by: PHYSICIAN ASSISTANT

## 2025-07-24 PROCEDURE — 99213 OFFICE O/P EST LOW 20 MIN: CPT | Performed by: PHYSICIAN ASSISTANT

## 2025-07-24 RX ORDER — TRIAMCINOLONE ACETONIDE 0.25 MG/G
OINTMENT TOPICAL 2 TIMES DAILY
Qty: 15 G | Refills: 0 | Status: SHIPPED | OUTPATIENT
Start: 2025-07-24

## 2025-07-24 NOTE — PROGRESS NOTES
Adult Annual Physical  Name: Jaime Carranza      : 1980      MRN: [id number]  Encounter Provider: Julio Read PA-C  Encounter Date: 2025   Encounter department: [facility]    :  Assessment & Plan  Annual physical exam         Prediabetes    Orders:    Hemoglobin A1C; Future    Elevated LDL cholesterol level    Orders:    Lipid Panel with Direct LDL reflex; Future    Facial dermatitis     - Rash on upper L forehead x 1 month    - Does not burn, occasionally itches   - Denies associated symptoms    - Will trial low-mid potency topical corticosteroid BID    - RTC if symptoms worsen or fail to improve  Orders:    triamcinolone (KENALOG) 0.025 % ointment; Apply topically 2 (two) times a day        Preventive Screenings:  - Diabetes Screening: screening up-to-date  - Cholesterol Screening: orders placed   - Hepatitis C screening: screening up-to-date   - HIV screening: screening up-to-date   - Colon cancer screening: screening not indicated   - Lung cancer screening: screening not indicated   - Prostate cancer screening: screening not indicated     Immunizations:  - Immunizations due: Prevnar 20, Tdap, Zoster (Shingrix) and Hepatitis A    Counseling/Anticipatory Guidance:    - Dental health: discussed importance of regular tooth brushing, flossing, and dental visits.   - Sexual health: discussed sexually transmitted diseases, partner selection, use of condoms, avoidance of unintended pregnancy, and contraceptive alternatives.   - Diet: discussed recommendations for a healthy/well-balanced diet.   - Exercise: the importance of regular exercise/physical activity was discussed. Recommend exercise 3-5 times per week for at least 30 minutes.          History of Present Illness     Adult Annual Physical:  Patient presents for annual physical.     Diet and Physical Activity:  - Diet/Nutrition: no special diet.  - Exercise: strength training exercises and 3-4 times a week on  "average.    Depression Screening:  - PHQ-2 Score: 0    General Health:  - Sleep: 7-8 hours of sleep on average.  - Hearing: decreased hearing right ear and normal hearing left ear.  - Vision: no vision problems.  - Dental: no dental visits for > 1 year.    /GYN Health:  - Follows with GYN: no.   - History of STDs: no     Health:  - History of STDs: no.   - Urinary symptoms: none.     Advanced Care Planning:  - Has an advanced directive?: no    - Has a durable medical POA?: no      Review of Systems   Constitutional:  Negative for chills and fever.   HENT:  Negative for ear pain and sore throat.    Eyes:  Negative for pain and visual disturbance.   Respiratory:  Negative for cough and shortness of breath.    Cardiovascular:  Negative for chest pain and palpitations.   Gastrointestinal:  Negative for abdominal pain and vomiting.   Genitourinary:  Negative for dysuria and hematuria.   Musculoskeletal:  Negative for arthralgias and back pain.   Skin:  Positive for rash. Negative for color change.   Neurological:  Negative for seizures and syncope.   All other systems reviewed and are negative.        Objective   /70   Pulse 79   Temp (!) 96.7 °F (35.9 °C)   Ht 5' 8.11\" (1.73 m)   Wt 87.2 kg (192 lb 3.2 oz)   SpO2 96%   BMI 29.13 kg/m²     Physical Exam  Constitutional:       Appearance: Normal appearance.   HENT:      Head: Normocephalic.      Right Ear: External ear normal.      Left Ear: External ear normal.      Nose: Nose normal.      Mouth/Throat:      Mouth: Mucous membranes are moist.      Pharynx: Oropharynx is clear.     Eyes:      Conjunctiva/sclera: Conjunctivae normal.       Cardiovascular:      Rate and Rhythm: Normal rate and regular rhythm.      Heart sounds: Normal heart sounds.   Pulmonary:      Effort: Pulmonary effort is normal.      Breath sounds: Normal breath sounds.   Abdominal:      General: Bowel sounds are normal.      Palpations: Abdomen is soft.     Skin:     Findings: Rash " present.     Neurological:      Mental Status: He is alert and oriented to person, place, and time.     Psychiatric:         Behavior: Behavior normal.

## 2025-07-24 NOTE — PATIENT INSTRUCTIONS
"Patient Education     Routine physical for adults   The Basics   Written by the doctors and editors at [facility]   What is a physical? -- A physical is a routine visit, or \"check-up,\" with your doctor. You might also hear it called a \"wellness visit\" or \"preventive visit.\"  During each visit, the doctor will:   Ask about your physical and mental health   Ask about your habits, behaviors, and lifestyle   Do an exam   Give you vaccines if needed   Talk to you about any medicines you take   Give advice about your health   Answer your questions  Getting regular check-ups is an important part of taking care of your health. It can help your doctor find and treat any problems you have. But it's also important for preventing health problems.  A routine physical is different from a \"sick visit.\" A sick visit is when you see a doctor because of a health concern or problem. Since physicals are scheduled ahead of time, you can think about what you want to ask the doctor.  How often should I get a physical? -- It depends on your age and health. In general, for people age 21 years and older:   If you are younger than 50 years, you might be able to get a physical every 3 years.   If you are 50 years or older, your doctor might recommend a physical every year.  If you have an ongoing health condition, like diabetes or high blood pressure, your doctor will probably want to see you more often.  What happens during a physical? -- In general, each visit will include:   Physical exam - The doctor or nurse will check your height, weight, heart rate, and blood pressure. They will also look at your eyes and ears. They will ask about how you are feeling and whether you have any symptoms that bother you.   Medicines - It's a good idea to bring a list of all the medicines you take to each doctor visit. Your doctor will talk to you about your medicines and answer any questions. Tell them if you are having any side effects that bother you. You " "should also tell them if you are having trouble paying for any of your medicines.   Habits and behaviors - This includes:   Your diet   Your exercise habits   Whether you smoke, drink alcohol, or use drugs   Whether you are sexually active   Whether you feel safe at home  Your doctor will talk to you about things you can do to improve your health and lower your risk of health problems. They will also offer help and support. For example, if you want to quit smoking, they can give you advice and might prescribe medicines. If you want to improve your diet or get more physical activity, they can help you with this, too.   Lab tests, if needed - The tests you get will depend on your age and situation. For example, your doctor might want to check your:   Cholesterol   Blood sugar   Iron level   Vaccines - The recommended vaccines will depend on your age, health, and what vaccines you already had. Vaccines are very important because they can prevent certain serious or deadly infections.   Discussion of screening - \"Screening\" means checking for diseases or other health problems before they cause symptoms. Your doctor can recommend screening based on your age, risk, and preferences. This might include tests to check for:   Cancer, such as breast, prostate, cervical, ovarian, colorectal, prostate, lung, or skin cancer   Sexually transmitted infections, such as chlamydia and gonorrhea   Mental health conditions like depression and anxiety  Your doctor will talk to you about the different types of screening tests. They can help you decide which screenings to have. They can also explain what the results might mean.   Answering questions - The physical is a good time to ask the doctor or nurse questions about your health. If needed, they can refer you to other doctors or specialists, too.  Adults older than 65 years often need other care, too. As you get older, your doctor will talk to you about:   How to prevent falling at " home   Hearing or vision tests   Memory testing   How to take your medicines safely   Making sure that you have the help and support you need at home  All topics are updated as new evidence becomes available and our peer review process is complete.  This topic retrieved from [employer] on: May 02, 2024.  Topic 474851 Version 1.0  Release: 32.4.3 - C32.122  © 2024 [employer]Date, Inc. and/or its affiliates. All rights reserved.  Consumer Information Use and Disclaimer   Disclaimer: This generalized information is a limited summary of diagnosis, treatment, and/or medication information. It is not meant to be comprehensive and should be used as a tool to help the user understand and/or assess potential diagnostic and treatment options. It does NOT include all information about conditions, treatments, medications, side effects, or risks that may apply to a specific patient. It is not intended to be medical advice or a substitute for the medical advice, diagnosis, or treatment of a health care provider based on the health care provider's examination and assessment of a patient's specific and unique circumstances. Patients must speak with a health care provider for complete information about their health, medical questions, and treatment options, including any risks or benefits regarding use of medications. This information does not endorse any treatments or medications as safe, effective, or approved for treating a specific patient. Up[employer]Date, Inc. and its affiliates disclaim any warranty or liability relating to this information or the use thereof.The use of this information is governed by the Terms of Use, available at https://www.[employer]suwer.com/en/know/clinical-effectiveness-terms. 2024© UpToDate, Inc. and its affiliates and/or licensors. All rights reserved.  Copyright   © 2024 Up[employer]Date, Inc. and/or its affiliates. All rights reserved.    Patient Education     Examen físico de rutina para adultos   Conceptos Básicos  "  Redactado por los médicos y editores de [employer]   ¿Qué es un examen físico? -- Un examen físico es zeke consulta de rutina o \"revisión\" con vallejo médico. También se conoce jessa \"consulta de bienestar\" o \"consulta preventiva\".  Leann cada consulta, el médico hará lo siguiente:   Preguntará por vallejo jared física y mental   Preguntará sobre shannen hábitos, conductas y estilo de julia   Le hará un examen   Le administrará las vacunas que patti necesarias   Hablará con usted sobre cualquier medicina que tome   Le dará consejos sobre vallejo jared   Responderá shannen preguntas  Hacerse revisiones periódicas es zeke parte importante del cuidado de vallejo jared. Puede ayudar a vallejo médico a encontrar y tratar cualquier problema que tenga. Mari[place] también es importante para prevenir problemas de jared.  Un examen físico de rutina es diferente de zeke \"consulta por enfermedad\". Zeke consulta por enfermedad es cuando lo atiende un médico debido a un problema o inquietud de jared. Dado que los exámenes físicos se programan con anticipación, usted puede pensar en lo que quiere preguntarle al médico.  ¿Con qué frecuencia preet hacerme un examen físico? -- Depende de vallejo edad y de vallejo estado de jared. En general, en el felisha de las personas mayores de 21 años:   Si tiene menos de 50 años, es posible que pueda hacerse un examen físico cada 3 años.   Si tiene 50 años o más, vallejo médico podría recomendarle un examen físico cada año.  Si tiene un padecimiento de jared crónico, dana jessa diabetes o presión arterial yamilex, vallejo médico probablemente querrá verlo con más frecuencia.  ¿Qué sucede leann un examen físico? -- En general, cada consulta incluirá:   Examen físico - El médico o enfermero revisará vallejo estatura, peso, frecuencia cardíaca y presión arterial. También le examinará los ojos y los oídos. Le preguntará cómo se siente y si tiene algún síntoma que le moleste.   Medicinas - Es zeke buena idea llevar zeke lista de todos las medicinas que jude cada vez que acude " "a la consulta médica. Vallejo médico le hablará sobre rebeca medicinas y responderá a rebeca preguntas. Dígale si tiene algún efecto secundario que le moleste. También debe informarle si tiene dificultades para pagar alguna de rebeca medicinas.   Hábitos y comportamientos - [place] incluye:   Vallejo dieta   Rebeca hábitos de ejercicio   Si fuma, hallie alcohol o consume drogas   Si es sexualmente activo   Si se siente seguro en casa  Vallejo médico hablará con usted sobre las cosas que puede hacer para mejorar vallejo jared y reducir el riesgo de tener problemas de jared. También ofrecerá ayuda y apoyo. Por ejemplo, si quiere dejar de fumar, puede darle consejos y recetarle medicinas. Si quiere mejorar vallejo alimentación o realizar más actividad física, vallejo médico también puede ayudarlo a lograr estos objetivos.   Pruebas de laboratorio, si son necesarias - Las pruebas que le realicen dependerán de vallejo edad y situación. Por ejemplo, es posible que vallejo médico quiera revisar vallejo:   Colesterol   Azúcar en mayra   Nivel de jesica   Vacunas - Las vacunas recomendadas dependerán de vallejo edad, vallejo jarde y de las vacunas que ya haya recibido. Las vacunas son muy importantes porque pueden prevenir ciertas infecciones graves o mortales.   Análisis sobre las pruebas de detección - \"Detección\" significa revisar si hay enfermedades u otros problemas de jared antes de que causen síntomas. Vallejo médico puede recomendar pruebas de detección según vallejo edad, riesgo y preferencias. [place] podría incluir pruebas para detectar:   Cáncer, jessa cáncer de seno, próstata, damir uterino, ovario, colorrectal, próstata, pulmón o piel   Infecciones de transmisión sexual tales jessa clamidia y gonorrea   Padecimientos de jared mental tales jessa depresión y ansiedad.  El médico le hablará sobre los diferentes tipos de pruebas de detección. Puede ayudarlo a decidir qué pruebas de detección debe hacerse. También le puede explicar lo que podrían significar los resultados.   Responder preguntas - El " examen físico es un buen momento para hacerle preguntas al médico o enfermero sobre vallejo jared. Si es necesario, también puede derivarlo a otros médicos o especialistas.  Los adultos mayores de 65 años a menudo también necesitan otros cuidados. A medida que envejece, vallejo médico hablará con usted sobre:   Cómo evitar las caídas en el hogar   Pruebas de audición o visión   Pruebas de memoria   Cómo terry shannen medicinas de manera hinds   Asegurarse de tener la ayuda y el apoyo que necesita en casa  Todos los artículos se actualizan a medida que se descubre nueva evidencia y culmina nuestro proceso de evaluación por homólogos   Fredrick artículo se recuperó de [employer] el: May 02, 2024.  Artículo [id number] Versión [id number]  Release: 32.4.3 - C32.122  © 2024 [employer]Date, Inc. Todos los derechos reservados.  Exención de responsabilidad y uso de la información del consumidor   Descargo de responsabilidad: esta información generalizada es un resumen limitado de información sobre el diagnóstico, el tratamiento y/o los medicamentos. No pretende ser exhaustiva y se debe utilizar jessa herramienta para ayudar al usuario a comprender y/o evaluar las posibles opciones de diagnóstico y tratamiento. No incluye toda la información sobre afecciones, tratamientos, medicamentos, efectos secundarios o riesgos puedan ser aplicables a un paciente específico. No tiene el propósito de servir jessa recomendación médica ni de sustituir la recomendación médica, el diagnóstico o el tratamiento de un profesional de atención médica que se base en el examen y la evaluación de fredrick profesional de la jared respecto a las circunstancias específicas y únicas del paciente. Los pacientes deben hablar con un profesional de atención médica para obtener información completa sobre vallejo jared, cuestiones médicas y opciones de tratamiento, incluidos los riesgos o los beneficios relacionados con el uso de medicamentos. Esta información no certifica que los tratamientos o  medicamentos patti seguros, eficaces o estén aprobados para tratar a un paciente específico. [employer] y shannen afiliados renuncian a cualquier garantía o responsabilidad relacionada con esta información o el uso de la misma.El uso de esta información está sujeto a las Condiciones de uso, disponibles en https://www.[employer]wer.com/en/know/clinical-effectiveness-terms. 2024© [employer] y shannen afiliados y/o licenciantes. Todos los derechos reservados.  Copyright   © 2024 [employer] Todos los derechos reservados.

## 2025-08-09 ENCOUNTER — APPOINTMENT (OUTPATIENT)
Dept: LAB | Facility: CLINIC | Age: 45
End: 2025-08-09
Payer: COMMERCIAL